# Patient Record
Sex: FEMALE
[De-identification: names, ages, dates, MRNs, and addresses within clinical notes are randomized per-mention and may not be internally consistent; named-entity substitution may affect disease eponyms.]

---

## 2017-01-07 ENCOUNTER — HOSPITAL ENCOUNTER (INPATIENT)
Dept: HOSPITAL 5 - ED | Age: 22
LOS: 12 days | Discharge: HOME | DRG: 885 | End: 2017-01-19
Attending: INTERNAL MEDICINE | Admitting: INTERNAL MEDICINE
Payer: MEDICAID
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
## 2017-01-07 DIAGNOSIS — T43.595A: ICD-10-CM

## 2017-01-07 DIAGNOSIS — F20.9: ICD-10-CM

## 2017-01-07 DIAGNOSIS — N39.0: ICD-10-CM

## 2017-01-07 DIAGNOSIS — B96.20: ICD-10-CM

## 2017-01-07 DIAGNOSIS — F17.200: ICD-10-CM

## 2017-01-07 DIAGNOSIS — G43.909: ICD-10-CM

## 2017-01-07 DIAGNOSIS — Z79.899: ICD-10-CM

## 2017-01-07 DIAGNOSIS — K59.00: ICD-10-CM

## 2017-01-07 DIAGNOSIS — F31.9: Primary | ICD-10-CM

## 2017-01-07 LAB
ALBUMIN SERPL-MCNC: 3.5 G/DL (ref 3.9–5)
ALBUMIN/GLOB SERPL: 1 %
ALP SERPL-CCNC: 72 UNITS/L (ref 35–129)
ALT SERPL-CCNC: 12 UNITS/L (ref 7–56)
ANION GAP SERPL CALC-SCNC: 19 MMOL/L
ANISOCYTOSIS BLD QL SMEAR: (no result)
BACTERIA #/AREA URNS HPF: (no result) /HPF
BILIRUB DIRECT SERPL-MCNC: < 0.2 MG/DL (ref 0–0.2)
BILIRUB INDIRECT SERPL-MCNC: 0 MG/DL
BILIRUB SERPL-MCNC: < 0.2 MG/DL (ref 0.1–1.2)
BILIRUB UR QL STRIP: (no result)
BLASTOCYTES % (MANUAL): 0 %
BLOOD UR QL VISUAL: (no result)
BUN SERPL-MCNC: 9 MG/DL (ref 7–17)
BUN/CREAT SERPL: 15 %
CALCIUM SERPL-MCNC: 9 MG/DL (ref 8.4–10.2)
CHLORIDE SERPL-SCNC: 104.7 MMOL/L (ref 98–107)
CO2 SERPL-SCNC: 22 MMOL/L (ref 22–30)
GLUCOSE SERPL-MCNC: 96 MG/DL (ref 65–100)
HCT VFR BLD CALC: 39.5 % (ref 30.3–42.9)
HGB BLD-MCNC: 12.9 GM/DL (ref 10.1–14.3)
KETONES UR STRIP-MCNC: (no result) MG/DL
LEUKOCYTE ESTERASE UR QL STRIP: (no result)
MCH RBC QN AUTO: 28 PG (ref 28–32)
MCHC RBC AUTO-ENTMCNC: 33 % (ref 30–34)
MCV RBC AUTO: 87 FL (ref 79–97)
MUCOUS THREADS #/AREA URNS HPF: (no result) /HPF
NITRITE UR QL STRIP: (no result)
PH UR STRIP: 5 [PH] (ref 5–7)
PLATELET # BLD: 407 K/MM3 (ref 140–440)
POTASSIUM SERPL-SCNC: 4.9 MMOL/L (ref 3.6–5)
PROT SERPL-MCNC: 6.9 G/DL (ref 6.3–8.2)
RBC # BLD AUTO: 4.56 M/MM3 (ref 3.65–5.03)
RBC #/AREA URNS HPF: 3 /HPF (ref 0–6)
SODIUM SERPL-SCNC: 141 MMOL/L (ref 137–145)
TOTAL CELLS COUNTED PERCENT: 10
URINE DRUGS OF ABUSE NOTE: (no result)
UROBILINOGEN UR-MCNC: 2 MG/DL (ref ?–2)
WBC # BLD AUTO: 12.8 K/MM3 (ref 4.5–11)
WBC #/AREA URNS HPF: 22 /HPF (ref 0–6)

## 2017-01-07 PROCEDURE — 81025 URINE PREGNANCY TEST: CPT

## 2017-01-07 PROCEDURE — 81001 URINALYSIS AUTO W/SCOPE: CPT

## 2017-01-07 PROCEDURE — 99406 BEHAV CHNG SMOKING 3-10 MIN: CPT

## 2017-01-07 PROCEDURE — 87040 BLOOD CULTURE FOR BACTERIA: CPT

## 2017-01-07 PROCEDURE — 82550 ASSAY OF CK (CPK): CPT

## 2017-01-07 PROCEDURE — 87186 SC STD MICRODIL/AGAR DIL: CPT

## 2017-01-07 PROCEDURE — 87076 CULTURE ANAEROBE IDENT EACH: CPT

## 2017-01-07 PROCEDURE — 85007 BL SMEAR W/DIFF WBC COUNT: CPT

## 2017-01-07 PROCEDURE — 85025 COMPLETE CBC W/AUTO DIFF WBC: CPT

## 2017-01-07 PROCEDURE — 99285 EMERGENCY DEPT VISIT HI MDM: CPT

## 2017-01-07 PROCEDURE — 80053 COMPREHEN METABOLIC PANEL: CPT

## 2017-01-07 PROCEDURE — 80320 DRUG SCREEN QUANTALCOHOLS: CPT

## 2017-01-07 PROCEDURE — 80048 BASIC METABOLIC PNL TOTAL CA: CPT

## 2017-01-07 PROCEDURE — 80307 DRUG TEST PRSMV CHEM ANLYZR: CPT

## 2017-01-07 PROCEDURE — G0480 DRUG TEST DEF 1-7 CLASSES: HCPCS

## 2017-01-07 PROCEDURE — 80074 ACUTE HEPATITIS PANEL: CPT

## 2017-01-07 PROCEDURE — 80178 ASSAY OF LITHIUM: CPT

## 2017-01-07 PROCEDURE — 36415 COLL VENOUS BLD VENIPUNCTURE: CPT

## 2017-01-07 PROCEDURE — 87086 URINE CULTURE/COLONY COUNT: CPT

## 2017-01-07 NOTE — EMERGENCY DEPARTMENT REPORT
HPI





- General


Chief Complaint: Psych


Time Seen by Provider: 01/07/17 17:37





- HPI


HPI: 


Room 15





The patient is a 21-year-old female presenting with a chief complaint of 

aggressive behavior.  The patient states she was at home and began "acting out"

, being disrespectful family.  Subsequently police were called and he fell the 

patient crying irate and upset.  Police report the caretaker advised that the 

patient was offered medications, holes in walls of the residents has a history 

of schizophrenia and is psychotic.  The patient was subsequently brought to the 

emergency department in handcuffs by police.  Patient denies suicidal or 

homicidal ideation.  Patient denies auditory or visual hallucinations.  The 

patient states she feels upset.  The patient states she does not have access to 

her medications.  The patient states she's been on lithium for 3 years and her 

arm administers them to her daily.  The patient states last time she took 

lithium was approximately 2 days ago





Location: Mental state


Duration: [see above]


Quality: Aggressive


Severity: Moderate


Modifying factors: [see above]


Context: [see above]


Mode of transportation: [not driving]














ED Past Medical Hx





- Past Medical History


Previous Medical History?: Yes


Hx Psychiatric Treatment: Yes (bipolar)


Additional medical history: Pt stated that she suffered from migraine, 

depression





- Surgical History


Past Surgical History?: No





- Family History


Family history: no significant





- Social History


Smoking Status: Current Every Day Smoker (one pack per day)


Substance Use Type: None (denies illicit drug use)





- Medications


Home Medications: 


 Home Medications











 Medication  Instructions  Recorded  Confirmed  Last Taken  Type


 


Lithium  01/07/17  Unknown History


 


Wellbutrin  01/07/17  Unknown History














ED Review of Systems


ROS: 


Stated complaint: 1013


Other details as noted in HPI





Comment: All other systems reviewed and negative


Constitutional: denies: chills, fever


Eyes: denies: eye pain, eye discharge, vision change


ENT: denies: ear pain, throat pain


Respiratory: denies: cough, shortness of breath, wheezing


Cardiovascular: denies: chest pain, palpitations


Endocrine: no symptoms reported


Gastrointestinal: denies: abdominal pain, nausea, diarrhea


Genitourinary: denies: urgency, dysuria, discharge


Musculoskeletal: denies: back pain, joint swelling, arthralgia


Skin: denies: rash, lesions


Neurological: denies: headache, weakness, paresthesias


Psychiatric: other ("upset").  denies: auditory hallucinations, visual 

hallucinations, homicidal thoughts, suicidal thoughts


Hematological/Lymphatic: denies: easy bleeding, easy bruising





Physical Exam





- Physical Exam


Vital Signs: 


 Vital Signs











  01/07/17 01/07/17





  15:39 18:11


 


Temperature 97.7 F 


 


Pulse Rate 120 H 


 


Respiratory 24 24





Rate  


 


Blood Pressure 111/63 


 


O2 Sat by Pulse 95 95





Oximetry  











Physical Exam: 


GENERAL: The patient is well-developed well-nourished female lying on stretcher 

appearing emotionally distraught. []


HEENT: Normocephalic.  Extraocular motions are intact.  Patient has moist 

mucous membranes.


NECK: Supple.  Trachea Midline


CHEST/LUNGS: Clear to auscultation.  There is no respiratory distress noted.


HEART/CARDIOVASCULAR: Regular.  There is no tachycardia.  There is no gallop 

rub or murmur.


ABDOMEN: Abdomen is soft, nontender.  Patient has normal bowel sounds.  There 

is no abdominal distention.


SKIN: There is no rash.  There is no edema.  There is no diaphoresis.


NEURO: The patient is awake, alert, and oriented.  The patient is cooperative. 

  The patient has normal speech 


MUSCULOSKELETAL: There is no evidence of acute injury.








ED Course


 Vital Signs











  01/07/17 01/07/17





  15:39 18:11


 


Temperature 97.7 F 


 


Pulse Rate 120 H 


 


Respiratory 24 24





Rate  


 


Blood Pressure 111/63 


 


O2 Sat by Pulse 95 95





Oximetry  














- Consultations


Consultation #1: 





01/07/17 18:29


Poison control called- case discussed with poison control.  Recommend 

symptomatic and supportive care at this time.  Recommends following lithium 

levels every 2-4 hours until downtrending 2 in the therapeutic range





01/07/17 18:37








ED Medical Decision Making





- Lab Data


Result diagrams: 


 01/07/17 16:25





 01/07/17 16:25





 Laboratory Tests











  01/07/17 01/07/17 01/07/17





  15:52 15:52 16:25


 


WBC   


 


RBC   


 


Hgb   


 


Hct   


 


MCV   


 


MCH   


 


MCHC   


 


RDW   


 


Plt Count   


 


Lymph #   


 


Sodium    141


 


Potassium    4.9


 


Chloride    104.7


 


Carbon Dioxide    22


 


Anion Gap    19


 


BUN    9


 


Creatinine    0.6 L


 


Estimated GFR    > 60


 


BUN/Creatinine Ratio    15.00


 


Glucose    96


 


Calcium    9.0


 


Urine Color  Rabia  


 


Urine Turbidity  Slightly-cloudy  


 


Urine pH  5.0  


 


Ur Specific Gravity  1.024  


 


Urine Protein  30 mg/dl  


 


Urine Glucose (UA)  Neg  


 


Urine Ketones  Tr  


 


Urine Blood  Neg  


 


Urine Nitrite  Pos  


 


Urine Bilirubin  Neg  


 


Urine Urobilinogen  2.0  


 


Ur Leukocyte Esterase  Tr  


 


Urine WBC (Auto)  22.0 H  


 


Urine RBC (Auto)  3.0  


 


U Epithel Cells (Auto)  13.0  


 


Urine Bacteria (Auto)  1+  


 


Urine Mucus  3+  


 


Urine HCG, Qual  Negative  


 


Urine Opiates Screen   Presumptive negative 


 


Urine Methadone Screen   Presumptive negative 


 


Ur Barbiturates Screen   Presumptive negative 


 


Ur Phencyclidine Scrn   Presumptive negative 


 


Ur Amphetamines Screen   Presumptive positive 


 


U Benzodiazepines Scrn   Presumptive negative 


 


Lithium   


 


Urine Cocaine Screen   Presumptive negative 


 


U Marijuana (THC) Screen   Presumptive negative 


 


Drugs of Abuse Note   Disclamer 


 


Plasma/Serum Alcohol   














  01/07/17 01/07/17 01/07/17





  16:25 16:25 16:25


 


WBC   12.8 H 


 


RBC   4.56 


 


Hgb   12.9 


 


Hct   39.5 


 


MCV   87 


 


MCH   28 


 


MCHC   33 


 


RDW   14.3 


 


Plt Count   407 


 


Lymph #   Np 


 


Sodium   


 


Potassium   


 


Chloride   


 


Carbon Dioxide   


 


Anion Gap   


 


BUN   


 


Creatinine   


 


Estimated GFR   


 


BUN/Creatinine Ratio   


 


Glucose   


 


Calcium   


 


Urine Color   


 


Urine Turbidity   


 


Urine pH   


 


Ur Specific Gravity   


 


Urine Protein   


 


Urine Glucose (UA)   


 


Urine Ketones   


 


Urine Blood   


 


Urine Nitrite   


 


Urine Bilirubin   


 


Urine Urobilinogen   


 


Ur Leukocyte Esterase   


 


Urine WBC (Auto)   


 


Urine RBC (Auto)   


 


U Epithel Cells (Auto)   


 


Urine Bacteria (Auto)   


 


Urine Mucus   


 


Urine HCG, Qual   


 


Urine Opiates Screen   


 


Urine Methadone Screen   


 


Ur Barbiturates Screen   


 


Ur Phencyclidine Scrn   


 


Ur Amphetamines Screen   


 


U Benzodiazepines Scrn   


 


Lithium    2.5 H*


 


Urine Cocaine Screen   


 


U Marijuana (THC) Screen   


 


Drugs of Abuse Note   


 


Plasma/Serum Alcohol  < 0.01  

















- Differential Diagnosis


lithium toxicity, bipolar disorder


Critical care attestation.: 


If time is entered above; I have spent that time in minutes in the direct care 

of this critically ill patient, excluding procedure time.








ED Disposition


Clinical Impression: 


 Lithium toxicity, Bipolar disorder


Disposition: OP ADMITTED AS IP TO THIS HOSP


Is pt being admited?: Yes


Does the pt Need Aspirin: No


Condition: Fair


Time of Disposition: 18:46 (hospitalist paged)

## 2017-01-08 LAB
ALBUMIN SERPL-MCNC: 3.4 G/DL (ref 3.9–5)
ALBUMIN/GLOB SERPL: 1 %
ALP SERPL-CCNC: 70 UNITS/L (ref 35–129)
ALT SERPL-CCNC: 12 UNITS/L (ref 7–56)
ANION GAP SERPL CALC-SCNC: 18 MMOL/L
ANISOCYTOSIS BLD QL SMEAR: (no result)
BILIRUB SERPL-MCNC: 0.3 MG/DL (ref 0.1–1.2)
BLASTOCYTES % (MANUAL): 0 %
BUN SERPL-MCNC: 7 MG/DL (ref 7–17)
BUN/CREAT SERPL: 14 %
CALCIUM SERPL-MCNC: 9 MG/DL (ref 8.4–10.2)
CHLORIDE SERPL-SCNC: 103.3 MMOL/L (ref 98–107)
CO2 SERPL-SCNC: 21 MMOL/L (ref 22–30)
GLUCOSE SERPL-MCNC: 82 MG/DL (ref 65–100)
HCT VFR BLD CALC: 39.3 % (ref 30.3–42.9)
HGB BLD-MCNC: 12.9 GM/DL (ref 10.1–14.3)
MCH RBC QN AUTO: 29 PG (ref 28–32)
MCHC RBC AUTO-ENTMCNC: 33 % (ref 30–34)
MCV RBC AUTO: 88 FL (ref 79–97)
PLATELET # BLD: 394 K/MM3 (ref 140–440)
POTASSIUM SERPL-SCNC: 4.3 MMOL/L (ref 3.6–5)
PROT SERPL-MCNC: 6.8 G/DL (ref 6.3–8.2)
RBC # BLD AUTO: 4.48 M/MM3 (ref 3.65–5.03)
SODIUM SERPL-SCNC: 138 MMOL/L (ref 137–145)
TOTAL CELLS COUNTED PERCENT: 5
WBC # BLD AUTO: 12.6 K/MM3 (ref 4.5–11)

## 2017-01-08 RX ADMIN — ENOXAPARIN SODIUM SCH: 100 INJECTION SUBCUTANEOUS at 10:35

## 2017-01-08 RX ADMIN — CEFTRIAXONE SODIUM SCH: 1 INJECTION, POWDER, FOR SOLUTION INTRAMUSCULAR; INTRAVENOUS at 10:35

## 2017-01-08 NOTE — PROGRESS NOTE
Assessment and Plan


Assessment and plan: 


1. Acute psychosis with h/o schizophrenia with - lithium toxicity; cotn to hold 

lithium; she refused lab work; 1013 for safety and consult psyche


2. UTI - cotn ceftriaxone; urine c/s 


3. DVT prophylaxis- lovenox








History


Interval history: 


f/u acute psychosis; lithium toxicity


Patient agitated and aggresive; refused lab work for lithium level  








Hospitalist Physical





- Constitutional


Vitals: 


 











Temp Pulse Resp BP Pulse Ox


 


 97.8 F   86   16   97/59   96 


 


 01/08/17 08:15  01/08/17 08:15  01/08/17 08:15  01/08/17 08:15  01/08/17 08:15











General appearance: Present: no acute distress, other (irriated)





- EENT


Eyes: Present: PERRL, EOM intact.  Absent: scleral icterus, conjunctival 

injection


ENT: hearing intact





- Neck


Neck: Present: supple, normal ROM.  Absent: enlarged thyroid, masses or JVD





- Respiratory


Respiratory effort: normal


Respiratory: negative: diminished, rales, rhonchi, wheezing





- Cardiovascular


Rhythm: regular


Heart Sounds: Present: S1 & S2.  Absent: gallop





- Extremities


Extremities: no ischemia, pulses intact, pulses symmetrical, No edema


Peripheral Pulses: within normal limits





- Abdominal


General gastrointestinal: soft, non-tender, non-distended, normal bowel sounds





- Integumentary


Integumentary: Present: clear





- Psychiatric


Psychiatric: no intact judgment & insight (aggressive tone and abusive language 

with expletives), agitated, other





- Neurologic


Neurologic: CNII-XII intact, moves all extremities





Results





- Labs


CBC & Chem 7: 


 01/07/17 16:25





 01/07/17 16:25


Labs: 


 Laboratory Last Values











WBC  12.8 K/mm3 (4.5-11.0)  H  01/07/17  16:25    


 


RBC  4.56 M/mm3 (3.65-5.03)   01/07/17  16:25    


 


Hgb  12.9 gm/dl (10.1-14.3)   01/07/17  16:25    


 


Hct  39.5 % (30.3-42.9)   01/07/17  16:25    


 


MCV  87 fl (79-97)   01/07/17  16:25    


 


MCH  28 pg (28-32)   01/07/17  16:25    


 


MCHC  33 % (30-34)   01/07/17  16:25    


 


RDW  14.3 % (13.2-15.2)   01/07/17  16:25    


 


Plt Count  407 K/mm3 (140-440)   01/07/17  16:25    


 


Lymph #  Np   01/07/17  16:25    


 


Add Manual Diff  Complete   01/07/17  16:25    


 


Total Counted  100   01/07/17  16:25    


 


Seg Neuts % (Manual)  53.0 % (40.0-70.0)   01/07/17  16:25    


 


Band Neutrophils %  0 %  01/07/17  16:25    


 


Lymphocytes % (Manual)  37.0 % (13.4-35.0)  H  01/07/17  16:25    


 


Reactive Lymphs % (Man)  0 %  01/07/17  16:25    


 


Monocytes % (Manual)  6.0 % (0.0-7.3)   01/07/17  16:25    


 


Eosinophils % (Manual)  4.0 % (0.0-4.3)   01/07/17  16:25    


 


Basophils % (Manual)  0 % (0.0-1.8)   01/07/17  16:25    


 


Metamyelocytes %  0 %  01/07/17  16:25    


 


Myelocytes %  0 %  01/07/17  16:25    


 


Promyelocytes %  0 %  01/07/17  16:25    


 


Blast Cells %  0 %  01/07/17  16:25    


 


Nucleated RBC %  Not Reportable   01/07/17  16:25    


 


Seg Neutrophils # Man  6.8 K/mm3 (1.8-7.7)   01/07/17  16:25    


 


Band Neutrophils #  0.0 K/mm3  01/07/17  16:25    


 


Lymphocytes # (Manual)  4.7 K/mm3 (1.2-5.4)   01/07/17  16:25    


 


Abs React Lymphs (Man)  0.0 K/mm3  01/07/17  16:25    


 


Monocytes # (Manual)  0.8 K/mm3 (0.0-0.8)   01/07/17  16:25    


 


Eosinophils # (Manual)  0.5 K/mm3 (0.0-0.4)  H  01/07/17  16:25    


 


Basophils # (Manual)  0.0 K/mm3 (0.0-0.1)   01/07/17  16:25    


 


Metamyelocytes #  0.0 K/mm3  01/07/17  16:25    


 


Myelocytes #  0.0 K/mm3  01/07/17  16:25    


 


Promyelocytes #  0.0 K/mm3  01/07/17  16:25    


 


Blast Cells #  0.0 K/mm3  01/07/17  16:25    


 


WBC Morphology  Not Reportable   01/07/17  16:25    


 


Hypersegmented Neuts  Not Reportable   01/07/17  16:25    


 


Hyposegmented Neuts  Not Reportable   01/07/17  16:25    


 


Hypogranular Neuts  Not Reportable   01/07/17  16:25    


 


Smudge Cells  Not Reportable   01/07/17  16:25    


 


Toxic Granulation  Not Reportable   01/07/17  16:25    


 


Toxic Vacuolation  Not Reportable   01/07/17  16:25    


 


Dohle Bodies  Not Reportable   01/07/17  16:25    


 


Pelger-Huet Anomaly  Not Reportable   01/07/17  16:25    


 


Imer Rods  Not Reportable   01/07/17  16:25    


 


Platelet Estimate  Cons   01/07/17  16:25    


 


Clumped Platelets  Not Reportable   01/07/17  16:25    


 


Plt Clumps, EDTA  Not Reportable   01/07/17  16:25    


 


Large Platelets  Few   01/07/17  16:25    


 


Giant Platelets  Not Reportable   01/07/17  16:25    


 


Platelet Satelliting  Not Reportable   01/07/17  16:25    


 


Plt Morphology Comment  Not Reportable   01/07/17  16:25    


 


RBC Morphology  Not Reportable   01/07/17  16:25    


 


Dimorphic RBCs  Not Reportable   01/07/17  16:25    


 


Polychromasia  Not Reportable   01/07/17  16:25    


 


Hypochromasia  Not Reportable   01/07/17  16:25    


 


Poikilocytosis  Not Reportable   01/07/17  16:25    


 


Anisocytosis  1+   01/07/17  16:25    


 


Microcytosis  Not Reportable   01/07/17  16:25    


 


Macrocytosis  Not Reportable   01/07/17  16:25    


 


Spherocytes  Not Reportable   01/07/17  16:25    


 


Pappenheimer Bodies  Not Reportable   01/07/17  16:25    


 


Sickle Cells  Not Reportable   01/07/17  16:25    


 


Target Cells  Not Reportable   01/07/17  16:25    


 


Tear Drop Cells  Not Reportable   01/07/17  16:25    


 


Ovalocytes  Not Reportable   01/07/17  16:25    


 


Helmet Cells  Not Reportable   01/07/17  16:25    


 


[place] Bodies  Not Reportable   01/07/17  16:25    


 


Cabot Rings  Not Reportable   01/07/17  16:25    


 


Ashok Cells  Not Reportable   01/07/17  16:25    


 


Bite Cells  Not Reportable   01/07/17  16:25    


 


Crenated Cell  Not Reportable   01/07/17  16:25    


 


Elliptocytes  Not Reportable   01/07/17  16:25    


 


Acanthocytes (Spur)  Not Reportable   01/07/17  16:25    


 


Rouleaux  Not Reportable   01/07/17  16:25    


 


Hemoglobin C Crystals  Not Reportable   01/07/17  16:25    


 


Schistocytes  Not Reportable   01/07/17  16:25    


 


Malaria parasites  Not Reportable   01/07/17  16:25    


 


Tutu Bodies  Not Reportable   01/07/17  16:25    


 


Hem Pathologist Commnt  No   01/07/17  16:25    


 


Sodium  141 mmol/L (137-145)   01/07/17  16:25    


 


Potassium  4.9 mmol/L (3.6-5.0)   01/07/17  16:25    


 


Chloride  104.7 mmol/L ()   01/07/17  16:25    


 


Carbon Dioxide  22 mmol/L (22-30)   01/07/17  16:25    


 


Anion Gap  19 mmol/L  01/07/17  16:25    


 


BUN  9 mg/dL (7-17)   01/07/17  16:25    


 


Creatinine  0.6 mg/dL (0.7-1.2)  L  01/07/17  16:25    


 


Estimated GFR  > 60 ml/min  01/07/17  16:25    


 


BUN/Creatinine Ratio  15.00 %  01/07/17  16:25    


 


Glucose  96 mg/dL ()   01/07/17  16:25    


 


Calcium  9.0 mg/dL (8.4-10.2)   01/07/17  16:25    


 


Total Bilirubin  < 0.2 mg/dL (0.1-1.2)   01/07/17  16:25    


 


Direct Bilirubin  < 0.2 mg/dL (0-0.2)   01/07/17  16:25    


 


Indirect Bilirubin  0.0 mg/dL  01/07/17  16:25    


 


AST  15 units/L (5-40)   01/07/17  16:25    


 


ALT  12 units/L (7-56)   01/07/17  16:25    


 


Alkaline Phosphatase  72 units/L ()   01/07/17  16:25    


 


Total Protein  6.9 g/dL (6.3-8.2)   01/07/17  16:25    


 


Albumin  3.5 g/dL (3.9-5)  L  01/07/17  16:25    


 


Albumin/Globulin Ratio  1.0 %  01/07/17  16:25    


 


Urine Color  Rabia  (Yellow)   01/07/17  15:52    


 


Urine Turbidity  Slightly-cloudy  (Clear)   01/07/17  15:52    


 


Urine pH  5.0  (5.0-7.0)   01/07/17  15:52    


 


Ur Specific Gravity  1.024  (1.003-1.030)   01/07/17  15:52    


 


Urine Protein  30 mg/dl mg/dL (Negative)   01/07/17  15:52    


 


Urine Glucose (UA)  Neg mg/dL (Negative)   01/07/17  15:52    


 


Urine Ketones  Tr mg/dL (Negative)   01/07/17  15:52    


 


Urine Blood  Neg  (Negative)   01/07/17  15:52    


 


Urine Nitrite  Pos  (Negative)   01/07/17  15:52    


 


Urine Bilirubin  Neg  (Negative)   01/07/17  15:52    


 


Urine Urobilinogen  2.0 mg/dL (<2.0)   01/07/17  15:52    


 


Ur Leukocyte Esterase  Tr  (Negative)   01/07/17  15:52    


 


Urine WBC (Auto)  22.0 /HPF (0.0-6.0)  H  01/07/17  15:52    


 


Urine RBC (Auto)  3.0 /HPF (0.0-6.0)   01/07/17  15:52    


 


U Epithel Cells (Auto)  13.0 /HPF (0-13.0)   01/07/17  15:52    


 


Urine Bacteria (Auto)  1+ /HPF (Negative)   01/07/17  15:52    


 


Urine Mucus  3+ /HPF  01/07/17  15:52    


 


Urine HCG, Qual  Negative  (Negative)   01/07/17  15:52    


 


Salicylates  < 0.3 mg/dL (2.8-20.0)  L  01/07/17  16:25    


 


Urine Opiates Screen  Presumptive negative   01/07/17  15:52    


 


Urine Methadone Screen  Presumptive negative   01/07/17  15:52    


 


Acetaminophen  < 15.0 ug/mL (10.0-30.0)   01/07/17  16:25    


 


Ur Barbiturates Screen  Presumptive negative   01/07/17  15:52    


 


Ur Phencyclidine Scrn  Presumptive negative   01/07/17  15:52    


 


Ur Amphetamines Screen  Presumptive positive   01/07/17  15:52    


 


U Benzodiazepines Scrn  Presumptive negative   01/07/17  15:52    


 


Lithium  2.5 mmol/L (0.0-1.2)  H*  01/07/17  16:25    


 


Urine Cocaine Screen  Presumptive negative   01/07/17  15:52    


 


U Marijuana (THC) Screen  Presumptive negative   01/07/17  15:52    


 


Drugs of Abuse Note  Disclamer   01/07/17  15:52    


 


Plasma/Serum Alcohol  < 0.01 gm% (0-0.07)   01/07/17  16:25

## 2017-01-08 NOTE — HISTORY AND PHYSICAL REPORT
CHIEF COMPLAINT:  Aggressive behavior.



HISTORY OF PRESENT ILLNESS:  A 21-year-old female with a chief complaint of

aggressive behavior, brought in for acting out and being disrespect with the

family.  Subsequently, police were called and the patient started crying and

became agitated.  The patient was off her medication, but has been refusing to 
take

the medication.  She has history of schizophrenia and psychosis.  Was brought to

the Emergency Department in handcuffs.  The patient denies suicidal or homicidal

ideation.  The patient denies auditory or visual hallucination.  The patient

states she feels upset.  The patient states she does not have access to Medicare

to have medications.  The patient has been having lithium for 3 hours and

however, her aunt says she takes lithium everyday.  The patient states the last 
time she took

lithium was approximately 2 days ago.



PAST MEDICAL HISTORY:  Significant for bipolar disorder, migraine, and

depression.



PAST SURGICAL HISTORY:  None.



FAMILY HISTORY:  No significant family history like hypertension, schizophrenia,

etc.



SOCIAL HISTORY:  She smokes a pack a day.  Denies illicit drug use.



CURRENT MEDICATIONS:  Lithium and Wellbutrin.



REVIEW OF SYSTEMS:  Significant for aggressive behavior.  Otherwise, review of

systems is essentially negative.  No shortness of breath, no chest pain, no

palpitations, no nausea, no vomiting.  All 14 systems were reviewed.



PHYSICAL EXAMINATION:

GENERAL:  Young female lying in bed, agitated.

VITAL SIGNS:  Blood pressure is 111/63, temperature is 97.7, pulse is 120,

respiratory rate is 24.

HEENT:  Unremarkable.  Pupils equal and reactive.

NECK:  Supple, no lymphadenopathy, no thyromegaly.

LUNGS:  Clear to auscultation and percussion.  Good air entry.

CARDIOVASCULAR:  S1, S2 heard.  No gallop, no murmur, no rub.  Apical impulse in

the left fifth intercostal space and midclavicular line.

ABDOMEN:  Soft and benign.  No hepatosplenomegaly.  No guarding, no rigidity. 

Hernial orifices are normal.

EXTREMITIES:  Good pedal pulses.  No pedal edema.

CENTRAL NERVOUS SYSTEM:  Alert and oriented x4, nonfocal exam.



LABORATORY DATA:  White count is 12,800.  Electrolytes are normal.  Lithium

level is high at 2.5.  Albumin is 3.5.  Urine, wbc is 22.  Drug screen is

positive for amphetamines.



ASSESSMENT AND PLAN:

1.  Lithium toxicity.  The patient's lithium to be kept on hold and IV fluids,

and check the lithium level tomorrow and day after tomorrow.

2.  Urinary tract infection, treated with Rocephin 1 g IV piggyback q.24.

3.  Bipolar disorder, lithium and Wellbutrin kept on hold, mental health consult

requested.

4.  Deep venous thrombosis prophylaxis, Lovenox 40 mg subcutaneous daily.





DD: 01/08/2017 01:13

DT: 01/08/2017 07:07

JOB# [id number]  [id number]

SCOTTIE/CRYSTAL DAVILA

## 2017-01-09 RX ADMIN — ENOXAPARIN SODIUM SCH MG: 100 INJECTION SUBCUTANEOUS at 11:28

## 2017-01-09 RX ADMIN — CEFTRIAXONE SODIUM SCH MLS/HR: 1 INJECTION, POWDER, FOR SOLUTION INTRAMUSCULAR; INTRAVENOUS at 11:29

## 2017-01-09 NOTE — PROGRESS NOTE
Assessment and Plan


Assessment and plan: 


1. Acute psychosis with h/o schizophrenia with - lithium toxicity; cotn to hold 

lithium; she refused lab work today; 1013 for safety and await consult psyche


2. UTI - cotn ceftriaxone- today is day 2 /3; urine c/s ordered but done


3. DVT prophylaxis- lovenox








History


Interval history: 


f/u acute psychosis; lithium toxicity


Patient seen at the bedside; refused blood work for lithium level   








Hospitalist Physical





- Constitutional


Vitals: 


 











Temp Pulse Resp BP Pulse Ox


 


 97.5 F L  104 H  16   109/66   94 


 


 01/09/17 09:32  01/09/17 09:32  01/09/17 09:32  01/09/17 09:32  01/09/17 09:32











General appearance: Present: no acute distress, other (irriated)





- EENT


Eyes: Present: PERRL, EOM intact.  Absent: scleral icterus, conjunctival 

injection


ENT: hearing intact, clear oral mucosa, no oropharyngeal erythema, no poor 

dentition





- Neck


Neck: Present: supple, normal ROM.  Absent: enlarged thyroid, masses or JVD





- Respiratory


Respiratory effort: normal


Respiratory: negative: CTA, diminished, rales, rhonchi, wheezing





- Cardiovascular


Rhythm: regular


Heart Sounds: Present: S1 & S2.  Absent: gallop





- Extremities


Extremities: no ischemia, pulses intact, pulses symmetrical, No edema


Peripheral Pulses: within normal limits





- Abdominal


General gastrointestinal: soft, non-tender, non-distended





- Integumentary


Integumentary: Present: clear





- Psychiatric


Psychiatric: appropriate mood/affect, intact judgment & insight, cooperative





- Neurologic


Neurologic: CNII-XII intact, moves all extremities





Results





- Labs


CBC & Chem 7: 


 01/08/17 16:21





 01/08/17 16:21


Labs: 


 Laboratory Last Values











WBC  12.6 K/mm3 (4.5-11.0)  H  01/08/17  16:21    


 


RBC  4.48 M/mm3 (3.65-5.03)   01/08/17  16:21    


 


Hgb  12.9 gm/dl (10.1-14.3)   01/08/17  16:21    


 


Hct  39.3 % (30.3-42.9)   01/08/17  16:21    


 


MCV  88 fl (79-97)   01/08/17  16:21    


 


MCH  29 pg (28-32)   01/08/17  16:21    


 


MCHC  33 % (30-34)   01/08/17  16:21    


 


RDW  14.6 % (13.2-15.2)   01/08/17  16:21    


 


Plt Count  394 K/mm3 (140-440)   01/08/17  16:21    


 


Lymph #  Np   01/08/17  16:21    


 


Add Manual Diff  Complete   01/08/17  16:21    


 


Total Counted  100   01/08/17  16:21    


 


Seg Neuts % (Manual)  57.0 % (40.0-70.0)   01/08/17  16:21    


 


Band Neutrophils %  0 %  01/08/17  16:21    


 


Lymphocytes % (Manual)  38.0 % (13.4-35.0)  H  01/08/17  16:21    


 


Reactive Lymphs % (Man)  0 %  01/08/17  16:21    


 


Monocytes % (Manual)  4.0 % (0.0-7.3)   01/08/17  16:21    


 


Eosinophils % (Manual)  1.0 % (0.0-4.3)   01/08/17  16:21    


 


Basophils % (Manual)  0 % (0.0-1.8)   01/08/17  16:21    


 


Metamyelocytes %  0 %  01/08/17  16:21    


 


Myelocytes %  0 %  01/08/17  16:21    


 


Promyelocytes %  0 %  01/08/17  16:21    


 


Blast Cells %  0 %  01/08/17  16:21    


 


Nucleated RBC %  Not Reportable   01/08/17  16:21    


 


Seg Neutrophils # Man  7.2 K/mm3 (1.8-7.7)   01/08/17  16:21    


 


Band Neutrophils #  0.0 K/mm3  01/08/17  16:21    


 


Lymphocytes # (Manual)  4.8 K/mm3 (1.2-5.4)   01/08/17  16:21    


 


Abs React Lymphs (Man)  0.0 K/mm3  01/08/17  16:21    


 


Monocytes # (Manual)  0.5 K/mm3 (0.0-0.8)   01/08/17  16:21    


 


Eosinophils # (Manual)  0.1 K/mm3 (0.0-0.4)   01/08/17  16:21    


 


Basophils # (Manual)  0.0 K/mm3 (0.0-0.1)   01/08/17  16:21    


 


Metamyelocytes #  0.0 K/mm3  01/08/17  16:21    


 


Myelocytes #  0.0 K/mm3  01/08/17  16:21    


 


Promyelocytes #  0.0 K/mm3  01/08/17  16:21    


 


Blast Cells #  0.0 K/mm3  01/08/17  16:21    


 


WBC Morphology  Not Reportable   01/08/17  16:21    


 


Hypersegmented Neuts  Not Reportable   01/08/17  16:21    


 


Hyposegmented Neuts  Not Reportable   01/08/17  16:21    


 


Hypogranular Neuts  Not Reportable   01/08/17  16:21    


 


Smudge Cells  Not Reportable   01/08/17  16:21    


 


Toxic Granulation  Not Reportable   01/08/17  16:21    


 


Toxic Vacuolation  Not Reportable   01/08/17  16:21    


 


Dohle Bodies  Not Reportable   01/08/17  16:21    


 


Pelger-Huet Anomaly  Not Reportable   01/08/17  16:21    


 


Imer Rods  Not Reportable   01/08/17  16:21    


 


Platelet Estimate  Consistent w auto   01/08/17  16:21    


 


Clumped Platelets  Not Reportable   01/08/17  16:21    


 


Plt Clumps, EDTA  Not Reportable   01/08/17  16:21    


 


Large Platelets  Not Reportable   01/08/17  16:21    


 


Giant Platelets  Not Reportable   01/08/17  16:21    


 


Platelet Satelliting  Not Reportable   01/08/17  16:21    


 


Plt Morphology Comment  Not Reportable   01/08/17  16:21    


 


RBC Morphology  Not Reportable   01/08/17  16:21    


 


Dimorphic RBCs  Not Reportable   01/08/17  16:21    


 


Polychromasia  Not Reportable   01/08/17  16:21    


 


Hypochromasia  Not Reportable   01/08/17  16:21    


 


Poikilocytosis  Not Reportable   01/08/17  16:21    


 


Anisocytosis  1+   01/08/17  16:21    


 


Microcytosis  Not Reportable   01/08/17  16:21    


 


Macrocytosis  Not Reportable   01/08/17  16:21    


 


Spherocytes  Not Reportable   01/08/17  16:21    


 


Pappenheimer Bodies  Not Reportable   01/08/17  16:21    


 


Sickle Cells  Not Reportable   01/08/17  16:21    


 


Target Cells  Not Reportable   01/08/17  16:21    


 


Tear Drop Cells  Not Reportable   01/08/17  16:21    


 


Ovalocytes  Not Reportable   01/08/17  16:21    


 


Helmet Cells  Not Reportable   01/08/17  16:21    


 


Guerrero-[place] Bodies  Not Reportable   01/08/17  16:21    


 


Cabot Rings  Not Reportable   01/08/17  16:21    


 


[place] Cells  Not Reportable   01/08/17  16:21    


 


Bite Cells  Not Reportable   01/08/17  16:21    


 


Crenated Cell  Not Reportable   01/08/17  16:21    


 


Elliptocytes  Not Reportable   01/08/17  16:21    


 


Acanthocytes (Spur)  Not Reportable   01/08/17  16:21    


 


Rouleaux  Not Reportable   01/08/17  16:21    


 


Hemoglobin C Crystals  Not Reportable   01/08/17  16:21    


 


Schistocytes  Not Reportable   01/08/17  16:21    


 


Malaria parasites  Not Reportable   01/08/17  16:21    


 


Tutu Bodies  Not Reportable   01/08/17  16:21    


 


Hem Pathologist Commnt  No   01/08/17  16:21    


 


Sodium  138 mmol/L (137-145)   01/08/17  16:21    


 


Potassium  4.3 mmol/L (3.6-5.0)   01/08/17  16:21    


 


Chloride  103.3 mmol/L ()   01/08/17  16:21    


 


Carbon Dioxide  21 mmol/L (22-30)  L  01/08/17  16:21    


 


Anion Gap  18 mmol/L  01/08/17  16:21    


 


BUN  7 mg/dL (7-17)   01/08/17  16:21    


 


Creatinine  0.5 mg/dL (0.7-1.2)  L  01/08/17  16:21    


 


Estimated GFR  > 60 ml/min  01/08/17  16:21    


 


BUN/Creatinine Ratio  14.00 %  01/08/17  16:21    


 


Glucose  82 mg/dL ()   01/08/17  16:21    


 


Calcium  9.0 mg/dL (8.4-10.2)   01/08/17  16:21    


 


Total Bilirubin  0.3 mg/dL (0.1-1.2)   01/08/17  16:21    


 


Direct Bilirubin  < 0.2 mg/dL (0-0.2)   01/07/17  16:25    


 


Indirect Bilirubin  0.0 mg/dL  01/07/17  16:25    


 


AST  15 units/L (5-40)   01/08/17  16:21    


 


ALT  12 units/L (7-56)   01/08/17  16:21    


 


Alkaline Phosphatase  70 units/L ()   01/08/17  16:21    


 


Total Creatine Kinase  50 units/L ()   01/08/17  16:21    


 


Total Protein  6.8 g/dL (6.3-8.2)   01/08/17  16:21    


 


Albumin  3.4 g/dL (3.9-5)  L  01/08/17  16:21    


 


Albumin/Globulin Ratio  1.0 %  01/08/17  16:21    


 


Urine Color  Rabia  (Yellow)   01/07/17  15:52    


 


Urine Turbidity  Slightly-cloudy  (Clear)   01/07/17  15:52    


 


Urine pH  5.0  (5.0-7.0)   01/07/17  15:52    


 


Ur Specific Gravity  1.024  (1.003-1.030)   01/07/17  15:52    


 


Urine Protein  30 mg/dl mg/dL (Negative)   01/07/17  15:52    


 


Urine Glucose (UA)  Neg mg/dL (Negative)   01/07/17  15:52    


 


Urine Ketones  Tr mg/dL (Negative)   01/07/17  15:52    


 


Urine Blood  Neg  (Negative)   01/07/17  15:52    


 


Urine Nitrite  Pos  (Negative)   01/07/17  15:52    


 


Urine Bilirubin  Neg  (Negative)   01/07/17  15:52    


 


Urine Urobilinogen  2.0 mg/dL (<2.0)   01/07/17  15:52    


 


Ur Leukocyte Esterase  Tr  (Negative)   01/07/17  15:52    


 


Urine WBC (Auto)  22.0 /HPF (0.0-6.0)  H  01/07/17  15:52    


 


Urine RBC (Auto)  3.0 /HPF (0.0-6.0)   01/07/17  15:52    


 


U Epithel Cells (Auto)  13.0 /HPF (0-13.0)   01/07/17  15:52    


 


Urine Bacteria (Auto)  1+ /HPF (Negative)   01/07/17  15:52    


 


Urine Mucus  3+ /HPF  01/07/17  15:52    


 


Urine HCG, Qual  Negative  (Negative)   01/07/17  15:52    


 


Salicylates  < 0.3 mg/dL (2.8-20.0)  L  01/07/17  16:25    


 


Urine Opiates Screen  Presumptive negative   01/07/17  15:52    


 


Urine Methadone Screen  Presumptive negative   01/07/17  15:52    


 


Acetaminophen  < 15.0 ug/mL (10.0-30.0)   01/07/17  16:25    


 


Ur Barbiturates Screen  Presumptive negative   01/07/17  15:52    


 


Ur Phencyclidine Scrn  Presumptive negative   01/07/17  15:52    


 


Ur Amphetamines Screen  Presumptive positive   01/07/17  15:52    


 


U Benzodiazepines Scrn  Presumptive negative   01/07/17  15:52    


 


Lithium  1.8 mmol/L (0.0-1.2)  H*  01/08/17  16:21    


 


Urine Cocaine Screen  Presumptive negative   01/07/17  15:52    


 


U Marijuana (THC) Screen  Presumptive negative   01/07/17  15:52    


 


Drugs of Abuse Note  Disclamer   01/07/17  15:52    


 


Plasma/Serum Alcohol  < 0.01 gm% (0-0.07)   01/07/17  16:25

## 2017-01-10 RX ADMIN — ZOLPIDEM TARTRATE PRN MG: 5 TABLET ORAL at 01:58

## 2017-01-10 RX ADMIN — ENOXAPARIN SODIUM SCH: 100 INJECTION SUBCUTANEOUS at 09:49

## 2017-01-10 RX ADMIN — CEFTRIAXONE SODIUM SCH: 1 INJECTION, POWDER, FOR SOLUTION INTRAMUSCULAR; INTRAVENOUS at 09:49

## 2017-01-10 NOTE — PROGRESS NOTE
Assessment and Plan


Assessment and plan: 





1. Acute psychosis with h/o schizophrenia.  Continue 1013 for safety and await 

psych consultation.





2. UTI.  Continue ceftriaxone.  Follow-up urine cultures.





3.  Lithium toxicity.  Continue to monitor her and hold lithium.





4. DVT prophylaxis- lovenox





History


Interval history: 


No new issues overnight.








Hospitalist Physical





- Constitutional


Vitals: 


 











Temp Pulse Resp BP Pulse Ox


 


 97.9 F   71   16   110/60   100 


 


 01/10/17 08:02  01/10/17 08:02  01/10/17 08:02  01/10/17 08:02  01/10/17 08:02











General appearance: Present: no acute distress, other (irriated)





- EENT


Eyes: Present: PERRL, EOM intact


ENT: hearing intact, clear oral mucosa, dentition normal





- Neck


Neck: Present: supple, normal ROM





- Respiratory


Respiratory effort: normal


Respiratory: bilateral: CTA





- Cardiovascular


Rhythm: regular


Heart Sounds: Present: S1 & S2.  Absent: gallop, rub





- Extremities


Extremities: no ischemia, No edema, Full ROM





- Abdominal


General gastrointestinal: soft, non-tender, non-distended, normal bowel sounds





- Integumentary


Integumentary: Present: clear, warm, dry





- Neurologic


Neurologic: CNII-XII intact, moves all extremities





Results





- Labs


CBC & Chem 7: 


 01/08/17 16:21





 01/08/17 16:21


Labs: 


 Laboratory Last Values











WBC  12.6 K/mm3 (4.5-11.0)  H  01/08/17  16:21    


 


RBC  4.48 M/mm3 (3.65-5.03)   01/08/17  16:21    


 


Hgb  12.9 gm/dl (10.1-14.3)   01/08/17  16:21    


 


Hct  39.3 % (30.3-42.9)   01/08/17  16:21    


 


MCV  88 fl (79-97)   01/08/17  16:21    


 


MCH  29 pg (28-32)   01/08/17  16:21    


 


MCHC  33 % (30-34)   01/08/17  16:21    


 


RDW  14.6 % (13.2-15.2)   01/08/17  16:21    


 


Plt Count  394 K/mm3 (140-440)   01/08/17  16:21    


 


Lymph #  Np   01/08/17  16:21    


 


Add Manual Diff  Complete   01/08/17  16:21    


 


Total Counted  100   01/08/17  16:21    


 


Seg Neuts % (Manual)  57.0 % (40.0-70.0)   01/08/17  16:21    


 


Band Neutrophils %  0 %  01/08/17  16:21    


 


Lymphocytes % (Manual)  38.0 % (13.4-35.0)  H  01/08/17  16:21    


 


Reactive Lymphs % (Man)  0 %  01/08/17  16:21    


 


Monocytes % (Manual)  4.0 % (0.0-7.3)   01/08/17  16:21    


 


Eosinophils % (Manual)  1.0 % (0.0-4.3)   01/08/17  16:21    


 


Basophils % (Manual)  0 % (0.0-1.8)   01/08/17  16:21    


 


Metamyelocytes %  0 %  01/08/17  16:21    


 


Myelocytes %  0 %  01/08/17  16:21    


 


Promyelocytes %  0 %  01/08/17  16:21    


 


Blast Cells %  0 %  01/08/17  16:21    


 


Nucleated RBC %  Not Reportable   01/08/17  16:21    


 


Seg Neutrophils # Man  7.2 K/mm3 (1.8-7.7)   01/08/17  16:21    


 


Band Neutrophils #  0.0 K/mm3  01/08/17  16:21    


 


Lymphocytes # (Manual)  4.8 K/mm3 (1.2-5.4)   01/08/17  16:21    


 


Abs React Lymphs (Man)  0.0 K/mm3  01/08/17  16:21    


 


Monocytes # (Manual)  0.5 K/mm3 (0.0-0.8)   01/08/17  16:21    


 


Eosinophils # (Manual)  0.1 K/mm3 (0.0-0.4)   01/08/17  16:21    


 


Basophils # (Manual)  0.0 K/mm3 (0.0-0.1)   01/08/17  16:21    


 


Metamyelocytes #  0.0 K/mm3  01/08/17  16:21    


 


Myelocytes #  0.0 K/mm3  01/08/17  16:21    


 


Promyelocytes #  0.0 K/mm3  01/08/17  16:21    


 


Blast Cells #  0.0 K/mm3  01/08/17  16:21    


 


WBC Morphology  Not Reportable   01/08/17  16:21    


 


Hypersegmented Neuts  Not Reportable   01/08/17  16:21    


 


Hyposegmented Neuts  Not Reportable   01/08/17  16:21    


 


Hypogranular Neuts  Not Reportable   01/08/17  16:21    


 


Smudge Cells  Not Reportable   01/08/17  16:21    


 


Toxic Granulation  Not Reportable   01/08/17  16:21    


 


Toxic Vacuolation  Not Reportable   01/08/17  16:21    


 


Dohle Bodies  Not Reportable   01/08/17  16:21    


 


Pelger-Huet Anomaly  Not Reportable   01/08/17  16:21    


 


Imer Rods  Not Reportable   01/08/17  16:21    


 


Platelet Estimate  Consistent w auto   01/08/17  16:21    


 


Clumped Platelets  Not Reportable   01/08/17  16:21    


 


Plt Clumps, EDTA  Not Reportable   01/08/17  16:21    


 


Large Platelets  Not Reportable   01/08/17  16:21    


 


Giant Platelets  Not Reportable   01/08/17  16:21    


 


Platelet Satelliting  Not Reportable   01/08/17  16:21    


 


Plt Morphology Comment  Not Reportable   01/08/17  16:21    


 


RBC Morphology  Not Reportable   01/08/17  16:21    


 


Dimorphic RBCs  Not Reportable   01/08/17  16:21    


 


Polychromasia  Not Reportable   01/08/17  16:21    


 


Hypochromasia  Not Reportable   01/08/17  16:21    


 


Poikilocytosis  Not Reportable   01/08/17  16:21    


 


Anisocytosis  1+   01/08/17  16:21    


 


Microcytosis  Not Reportable   01/08/17  16:21    


 


Macrocytosis  Not Reportable   01/08/17  16:21    


 


Spherocytes  Not Reportable   01/08/17  16:21    


 


Pappenheimer Bodies  Not Reportable   01/08/17  16:21    


 


Sickle Cells  Not Reportable   01/08/17  16:21    


 


Target Cells  Not Reportable   01/08/17  16:21    


 


Tear Drop Cells  Not Reportable   01/08/17  16:21    


 


Ovalocytes  Not Reportable   01/08/17  16:21    


 


Helmet Cells  Not Reportable   01/08/17  16:21    


 


Gu[place] Bodies  Not Reportable   01/08/17  16:21    


 


Cabot Rings  Not Reportable   01/08/17  16:21    


 


[place] Cells  Not Reportable   01/08/17  16:21    


 


Bite Cells  Not Reportable   01/08/17  16:21    


 


Crenated Cell  Not Reportable   01/08/17  16:21    


 


Elliptocytes  Not Reportable   01/08/17  16:21    


 


Acanthocytes (Spur)  Not Reportable   01/08/17  16:21    


 


Rouleaux  Not Reportable   01/08/17  16:21    


 


Hemoglobin C Crystals  Not Reportable   01/08/17  16:21    


 


Schistocytes  Not Reportable   01/08/17  16:21    


 


Malaria parasites  Not Reportable   01/08/17  16:21    


 


Tutu Bodies  Not Reportable   01/08/17  16:21    


 


Hem Pathologist Commnt  No   01/08/17  16:21    


 


Sodium  138 mmol/L (137-145)   01/08/17  16:21    


 


Potassium  4.3 mmol/L (3.6-5.0)   01/08/17  16:21    


 


Chloride  103.3 mmol/L ()   01/08/17  16:21    


 


Carbon Dioxide  21 mmol/L (22-30)  L  01/08/17  16:21    


 


Anion Gap  18 mmol/L  01/08/17  16:21    


 


BUN  7 mg/dL (7-17)   01/08/17  16:21    


 


Creatinine  0.5 mg/dL (0.7-1.2)  L  01/08/17  16:21    


 


Estimated GFR  > 60 ml/min  01/08/17  16:21    


 


BUN/Creatinine Ratio  14.00 %  01/08/17  16:21    


 


Glucose  82 mg/dL ()   01/08/17  16:21    


 


Calcium  9.0 mg/dL (8.4-10.2)   01/08/17  16:21    


 


Total Bilirubin  0.3 mg/dL (0.1-1.2)   01/08/17  16:21    


 


Direct Bilirubin  < 0.2 mg/dL (0-0.2)   01/07/17  16:25    


 


Indirect Bilirubin  0.0 mg/dL  01/07/17  16:25    


 


AST  15 units/L (5-40)   01/08/17  16:21    


 


ALT  12 units/L (7-56)   01/08/17  16:21    


 


Alkaline Phosphatase  70 units/L ()   01/08/17  16:21    


 


Total Creatine Kinase  50 units/L ()   01/08/17  16:21    


 


Total Protein  6.8 g/dL (6.3-8.2)   01/08/17  16:21    


 


Albumin  3.4 g/dL (3.9-5)  L  01/08/17  16:21    


 


Albumin/Globulin Ratio  1.0 %  01/08/17  16:21    


 


Urine Color  Rabia  (Yellow)   01/07/17  15:52    


 


Urine Turbidity  Slightly-cloudy  (Clear)   01/07/17  15:52    


 


Urine pH  5.0  (5.0-7.0)   01/07/17  15:52    


 


Ur Specific Gravity  1.024  (1.003-1.030)   01/07/17  15:52    


 


Urine Protein  30 mg/dl mg/dL (Negative)   01/07/17  15:52    


 


Urine Glucose (UA)  Neg mg/dL (Negative)   01/07/17  15:52    


 


Urine Ketones  Tr mg/dL (Negative)   01/07/17  15:52    


 


Urine Blood  Neg  (Negative)   01/07/17  15:52    


 


Urine Nitrite  Pos  (Negative)   01/07/17  15:52    


 


Urine Bilirubin  Neg  (Negative)   01/07/17  15:52    


 


Urine Urobilinogen  2.0 mg/dL (<2.0)   01/07/17  15:52    


 


Ur Leukocyte Esterase  Tr  (Negative)   01/07/17  15:52    


 


Urine WBC (Auto)  22.0 /HPF (0.0-6.0)  H  01/07/17  15:52    


 


Urine RBC (Auto)  3.0 /HPF (0.0-6.0)   01/07/17  15:52    


 


U Epithel Cells (Auto)  13.0 /HPF (0-13.0)   01/07/17  15:52    


 


Urine Bacteria (Auto)  1+ /HPF (Negative)   01/07/17  15:52    


 


Urine Mucus  3+ /HPF  01/07/17  15:52    


 


Urine HCG, Qual  Negative  (Negative)   01/07/17  15:52    


 


Salicylates  < 0.3 mg/dL (2.8-20.0)  L  01/07/17  16:25    


 


Urine Opiates Screen  Presumptive negative   01/07/17  15:52    


 


Urine Methadone Screen  Presumptive negative   01/07/17  15:52    


 


Acetaminophen  < 15.0 ug/mL (10.0-30.0)   01/07/17  16:25    


 


Ur Barbiturates Screen  Presumptive negative   01/07/17  15:52    


 


Ur Phencyclidine Scrn  Presumptive negative   01/07/17  15:52    


 


Ur Amphetamines Screen  Presumptive positive   01/07/17  15:52    


 


U Benzodiazepines Scrn  Presumptive negative   01/07/17  15:52    


 


Lithium  0.1 mmol/L (0.0-1.2)   01/10/17  05:50    


 


Urine Cocaine Screen  Presumptive negative   01/07/17  15:52    


 


U Marijuana (THC) Screen  Presumptive negative   01/07/17  15:52    


 


Drugs of Abuse Note  Disclamer   01/07/17  15:52    


 


Plasma/Serum Alcohol  < 0.01 gm% (0-0.07)   01/07/17  16:25

## 2017-01-11 LAB
ANION GAP SERPL CALC-SCNC: 21 MMOL/L
ANISOCYTOSIS BLD QL SMEAR: (no result)
BASOPHILS NFR BLD AUTO: 1.2 % (ref 0–1.8)
BLASTOCYTES % (MANUAL): 0 %
BUN SERPL-MCNC: 11 MG/DL (ref 7–17)
BUN/CREAT SERPL: 15.71 %
CALCIUM SERPL-MCNC: 9 MG/DL (ref 8.4–10.2)
CHLORIDE SERPL-SCNC: 102.7 MMOL/L (ref 98–107)
CO2 SERPL-SCNC: 23 MMOL/L (ref 22–30)
EOSINOPHIL NFR BLD AUTO: 1.9 % (ref 0–4.3)
GLUCOSE SERPL-MCNC: 87 MG/DL (ref 65–100)
HCT VFR BLD CALC: 43.1 % (ref 30.3–42.9)
HGB BLD-MCNC: 14 GM/DL (ref 10.1–14.3)
MCH RBC QN AUTO: 28 PG (ref 28–32)
MCHC RBC AUTO-ENTMCNC: 33 % (ref 30–34)
MCV RBC AUTO: 88 FL (ref 79–97)
PLATELET # BLD: 339 K/MM3 (ref 140–440)
POTASSIUM SERPL-SCNC: 4.6 MMOL/L (ref 3.6–5)
RBC # BLD AUTO: 4.93 M/MM3 (ref 3.65–5.03)
SODIUM SERPL-SCNC: 142 MMOL/L (ref 137–145)
TOTAL CELLS COUNTED PERCENT: 5
WBC # BLD AUTO: 12.2 K/MM3 (ref 4.5–11)

## 2017-01-11 RX ADMIN — ZOLPIDEM TARTRATE PRN MG: 5 TABLET ORAL at 20:59

## 2017-01-11 RX ADMIN — CEFTRIAXONE SODIUM SCH: 1 INJECTION, POWDER, FOR SOLUTION INTRAMUSCULAR; INTRAVENOUS at 10:00

## 2017-01-11 RX ADMIN — ENOXAPARIN SODIUM SCH: 100 INJECTION SUBCUTANEOUS at 10:00

## 2017-01-11 NOTE — PROGRESS NOTE
Assessment and Plan


Assessment and plan: 





1. Acute psychosis with h/o schizophrenia.  Continue 1013 for safety.   Mental 

health  reassessed pt; she presents manic, racing thoughts, disorganized

, restless, manipulative. Pt has been referred to [facility].  Patient is 

medically clear for transfer.





2.  UTI.  Continue ceftriaxone.  Follow-up urine cultures.





3.  Lithium toxicity.  Continue to monitor her and hold lithium.





4.  DVT prophylaxis- lovenox





History


Interval history: 


No new issues overnight.








Hospitalist Physical





- Constitutional


Vitals: 


 











Temp Pulse Resp BP Pulse Ox


 


 97.5 F L  63   16   106/57   98 


 


 01/11/17 08:00  01/11/17 08:00  01/11/17 08:00  01/11/17 08:00  01/11/17 08:00











General appearance: Present: no acute distress, other (irriated)





- EENT


Eyes: Present: PERRL, EOM intact


ENT: hearing intact, clear oral mucosa, dentition normal





- Neck


Neck: Present: supple, normal ROM





- Respiratory


Respiratory effort: normal


Respiratory: bilateral: CTA





- Cardiovascular


Rhythm: regular


Heart Sounds: Present: S1 & S2.  Absent: gallop, rub





- Extremities


Extremities: no ischemia, No edema, Full ROM





- Abdominal


General gastrointestinal: soft, non-tender, non-distended, normal bowel sounds





- Integumentary


Integumentary: Present: clear, warm, dry





- Neurologic


Neurologic: CNII-XII intact, moves all extremities





Results





- Labs


CBC & Chem 7: 


 01/08/17 16:21





 01/08/17 16:21


Labs: 


 Laboratory Last Values











WBC  12.6 K/mm3 (4.5-11.0)  H  01/08/17  16:21    


 


RBC  4.48 M/mm3 (3.65-5.03)   01/08/17  16:21    


 


Hgb  12.9 gm/dl (10.1-14.3)   01/08/17  16:21    


 


Hct  39.3 % (30.3-42.9)   01/08/17  16:21    


 


MCV  88 fl (79-97)   01/08/17  16:21    


 


MCH  29 pg (28-32)   01/08/17  16:21    


 


MCHC  33 % (30-34)   01/08/17  16:21    


 


RDW  14.6 % (13.2-15.2)   01/08/17  16:21    


 


Plt Count  394 K/mm3 (140-440)   01/08/17  16:21    


 


Lymph #  Np   01/08/17  16:21    


 


Add Manual Diff  Complete   01/08/17  16:21    


 


Total Counted  100   01/08/17  16:21    


 


Seg Neuts % (Manual)  57.0 % (40.0-70.0)   01/08/17  16:21    


 


Band Neutrophils %  0 %  01/08/17  16:21    


 


Lymphocytes % (Manual)  38.0 % (13.4-35.0)  H  01/08/17  16:21    


 


Reactive Lymphs % (Man)  0 %  01/08/17  16:21    


 


Monocytes % (Manual)  4.0 % (0.0-7.3)   01/08/17  16:21    


 


Eosinophils % (Manual)  1.0 % (0.0-4.3)   01/08/17  16:21    


 


Basophils % (Manual)  0 % (0.0-1.8)   01/08/17  16:21    


 


Metamyelocytes %  0 %  01/08/17  16:21    


 


Myelocytes %  0 %  01/08/17  16:21    


 


Promyelocytes %  0 %  01/08/17  16:21    


 


Blast Cells %  0 %  01/08/17  16:21    


 


Nucleated RBC %  Not Reportable   01/08/17  16:21    


 


Seg Neutrophils # Man  7.2 K/mm3 (1.8-7.7)   01/08/17  16:21    


 


Band Neutrophils #  0.0 K/mm3  01/08/17  16:21    


 


Lymphocytes # (Manual)  4.8 K/mm3 (1.2-5.4)   01/08/17  16:21    


 


Abs React Lymphs (Man)  0.0 K/mm3  01/08/17  16:21    


 


Monocytes # (Manual)  0.5 K/mm3 (0.0-0.8)   01/08/17  16:21    


 


Eosinophils # (Manual)  0.1 K/mm3 (0.0-0.4)   01/08/17  16:21    


 


Basophils # (Manual)  0.0 K/mm3 (0.0-0.1)   01/08/17  16:21    


 


Metamyelocytes #  0.0 K/mm3  01/08/17  16:21    


 


Myelocytes #  0.0 K/mm3  01/08/17  16:21    


 


Promyelocytes #  0.0 K/mm3  01/08/17  16:21    


 


Blast Cells #  0.0 K/mm3  01/08/17  16:21    


 


WBC Morphology  Not Reportable   01/08/17  16:21    


 


Hypersegmented Neuts  Not Reportable   01/08/17  16:21    


 


Hyposegmented Neuts  Not Reportable   01/08/17  16:21    


 


Hypogranular Neuts  Not Reportable   01/08/17  16:21    


 


Smudge Cells  Not Reportable   01/08/17  16:21    


 


Toxic Granulation  Not Reportable   01/08/17  16:21    


 


Toxic Vacuolation  Not Reportable   01/08/17  16:21    


 


Dohle Bodies  Not Reportable   01/08/17  16:21    


 


Pelger-Huet Anomaly  Not Reportable   01/08/17  16:21    


 


Imer Rods  Not Reportable   01/08/17  16:21    


 


Platelet Estimate  Consistent w auto   01/08/17  16:21    


 


Clumped Platelets  Not Reportable   01/08/17  16:21    


 


Plt Clumps, EDTA  Not Reportable   01/08/17  16:21    


 


Large Platelets  Not Reportable   01/08/17  16:21    


 


Giant Platelets  Not Reportable   01/08/17  16:21    


 


Platelet Satelliting  Not Reportable   01/08/17  16:21    


 


Plt Morphology Comment  Not Reportable   01/08/17  16:21    


 


RBC Morphology  Not Reportable   01/08/17  16:21    


 


Dimorphic RBCs  Not Reportable   01/08/17  16:21    


 


Polychromasia  Not Reportable   01/08/17  16:21    


 


Hypochromasia  Not Reportable   01/08/17  16:21    


 


Poikilocytosis  Not Reportable   01/08/17  16:21    


 


Anisocytosis  1+   01/08/17  16:21    


 


Microcytosis  Not Reportable   01/08/17  16:21    


 


Macrocytosis  Not Reportable   01/08/17  16:21    


 


Spherocytes  Not Reportable   01/08/17  16:21    


 


Pappenheimer Bodies  Not Reportable   01/08/17  16:21    


 


Sickle Cells  Not Reportable   01/08/17  16:21    


 


Target Cells  Not Reportable   01/08/17  16:21    


 


Tear Drop Cells  Not Reportable   01/08/17  16:21    


 


Ovalocytes  Not Reportable   01/08/17  16:21    


 


Helmet Cells  Not Reportable   01/08/17  16:21    


 


Guerrero-[place] Bodies  Not Reportable   01/08/17  16:21    


 


Cabot Rings  Not Reportable   01/08/17  16:21    


 


[place] Cells  Not Reportable   01/08/17  16:21    


 


Bite Cells  Not Reportable   01/08/17  16:21    


 


Crenated Cell  Not Reportable   01/08/17  16:21    


 


Elliptocytes  Not Reportable   01/08/17  16:21    


 


Acanthocytes (Spur)  Not Reportable   01/08/17  16:21    


 


Rouleaux  Not Reportable   01/08/17  16:21    


 


Hemoglobin C Crystals  Not Reportable   01/08/17  16:21    


 


Schistocytes  Not Reportable   01/08/17  16:21    


 


Malaria parasites  Not Reportable   01/08/17  16:21    


 


Tutu Bodies  Not Reportable   01/08/17  16:21    


 


Hem Pathologist Commnt  No   01/08/17  16:21    


 


Sodium  138 mmol/L (137-145)   01/08/17  16:21    


 


Potassium  4.3 mmol/L (3.6-5.0)   01/08/17  16:21    


 


Chloride  103.3 mmol/L ()   01/08/17  16:21    


 


Carbon Dioxide  21 mmol/L (22-30)  L  01/08/17  16:21    


 


Anion Gap  18 mmol/L  01/08/17  16:21    


 


BUN  7 mg/dL (7-17)   01/08/17  16:21    


 


Creatinine  0.5 mg/dL (0.7-1.2)  L  01/08/17  16:21    


 


Estimated GFR  > 60 ml/min  01/08/17  16:21    


 


BUN/Creatinine Ratio  14.00 %  01/08/17  16:21    


 


Glucose  82 mg/dL ()   01/08/17  16:21    


 


Calcium  9.0 mg/dL (8.4-10.2)   01/08/17  16:21    


 


Total Bilirubin  0.3 mg/dL (0.1-1.2)   01/08/17  16:21    


 


Direct Bilirubin  < 0.2 mg/dL (0-0.2)   01/07/17  16:25    


 


Indirect Bilirubin  0.0 mg/dL  01/07/17  16:25    


 


AST  15 units/L (5-40)   01/08/17  16:21    


 


ALT  12 units/L (7-56)   01/08/17  16:21    


 


Alkaline Phosphatase  70 units/L ()   01/08/17  16:21    


 


Total Creatine Kinase  50 units/L ()   01/08/17  16:21    


 


Total Protein  6.8 g/dL (6.3-8.2)   01/08/17  16:21    


 


Albumin  3.4 g/dL (3.9-5)  L  01/08/17  16:21    


 


Albumin/Globulin Ratio  1.0 %  01/08/17  16:21    


 


Urine Color  Rabia  (Yellow)   01/07/17  15:52    


 


Urine Turbidity  Slightly-cloudy  (Clear)   01/07/17  15:52    


 


Urine pH  5.0  (5.0-7.0)   01/07/17  15:52    


 


Ur Specific Gravity  1.024  (1.003-1.030)   01/07/17  15:52    


 


Urine Protein  30 mg/dl mg/dL (Negative)   01/07/17  15:52    


 


Urine Glucose (UA)  Neg mg/dL (Negative)   01/07/17  15:52    


 


Urine Ketones  Tr mg/dL (Negative)   01/07/17  15:52    


 


Urine Blood  Neg  (Negative)   01/07/17  15:52    


 


Urine Nitrite  Pos  (Negative)   01/07/17  15:52    


 


Urine Bilirubin  Neg  (Negative)   01/07/17  15:52    


 


Urine Urobilinogen  2.0 mg/dL (<2.0)   01/07/17  15:52    


 


Ur Leukocyte Esterase  Tr  (Negative)   01/07/17  15:52    


 


Urine WBC (Auto)  22.0 /HPF (0.0-6.0)  H  01/07/17  15:52    


 


Urine RBC (Auto)  3.0 /HPF (0.0-6.0)   01/07/17  15:52    


 


U Epithel Cells (Auto)  13.0 /HPF (0-13.0)   01/07/17  15:52    


 


Urine Bacteria (Auto)  1+ /HPF (Negative)   01/07/17  15:52    


 


Urine Mucus  3+ /HPF  01/07/17  15:52    


 


Urine HCG, Qual  Negative  (Negative)   01/07/17  15:52    


 


Salicylates  < 0.3 mg/dL (2.8-20.0)  L  01/07/17  16:25    


 


Urine Opiates Screen  Presumptive negative   01/07/17  15:52    


 


Urine Methadone Screen  Presumptive negative   01/07/17  15:52    


 


Acetaminophen  < 15.0 ug/mL (10.0-30.0)   01/07/17  16:25    


 


Ur Barbiturates Screen  Presumptive negative   01/07/17  15:52    


 


Ur Phencyclidine Scrn  Presumptive negative   01/07/17  15:52    


 


Ur Amphetamines Screen  Presumptive positive   01/07/17  15:52    


 


U Benzodiazepines Scrn  Presumptive negative   01/07/17  15:52    


 


Lithium  0.1 mmol/L (0.0-1.2)   01/10/17  05:50    


 


Urine Cocaine Screen  Presumptive negative   01/07/17  15:52    


 


U Marijuana (THC) Screen  Presumptive negative   01/07/17  15:52    


 


Drugs of Abuse Note  Disclamer   01/07/17  15:52    


 


Plasma/Serum Alcohol  < 0.01 gm% (0-0.07)   01/07/17  16:25

## 2017-01-12 LAB
ANION GAP SERPL CALC-SCNC: 20 MMOL/L
BASOPHILS NFR BLD AUTO: 0.4 % (ref 0–1.8)
BUN SERPL-MCNC: 11 MG/DL (ref 7–17)
BUN/CREAT SERPL: 18.33 %
CALCIUM SERPL-MCNC: 8.8 MG/DL (ref 8.4–10.2)
CHLORIDE SERPL-SCNC: 105.1 MMOL/L (ref 98–107)
CO2 SERPL-SCNC: 21 MMOL/L (ref 22–30)
EOSINOPHIL NFR BLD AUTO: 1.6 % (ref 0–4.3)
GLUCOSE SERPL-MCNC: 129 MG/DL (ref 65–100)
HCT VFR BLD CALC: 42 % (ref 30.3–42.9)
HGB BLD-MCNC: 13.9 GM/DL (ref 10.1–14.3)
MCH RBC QN AUTO: 29 PG (ref 28–32)
MCHC RBC AUTO-ENTMCNC: 33 % (ref 30–34)
MCV RBC AUTO: 88 FL (ref 79–97)
PLATELET # BLD: 331 K/MM3 (ref 140–440)
POTASSIUM SERPL-SCNC: 4.7 MMOL/L (ref 3.6–5)
RBC # BLD AUTO: 4.78 M/MM3 (ref 3.65–5.03)
SODIUM SERPL-SCNC: 141 MMOL/L (ref 137–145)
WBC # BLD AUTO: 9.7 K/MM3 (ref 4.5–11)

## 2017-01-12 RX ADMIN — ZOLPIDEM TARTRATE PRN MG: 5 TABLET ORAL at 23:21

## 2017-01-12 RX ADMIN — ENOXAPARIN SODIUM SCH: 100 INJECTION SUBCUTANEOUS at 09:39

## 2017-01-12 RX ADMIN — CEFTRIAXONE SODIUM SCH: 1 INJECTION, POWDER, FOR SOLUTION INTRAMUSCULAR; INTRAVENOUS at 09:40

## 2017-01-12 NOTE — DISCHARGE SUMMARY
[id number]


01/12/17


Attending physician: 


NABILA BENITO





 





01/07/17 22:12


Consult to Mental Health [CONS] Routine 


   Reason For Exam: lithium toxicity+placement in psych facility once


   Place consult to:: 


   Notified:: 


   Was contact made?: Yes


   Comment:: KAYLA SPOKE WITH 











Primary care physician: 


PRIMARY CARE MD








Hospitalization


Reason for admission: acute psychosis


Condition: Fair


Hospital course: 


21-year-old female who presented through the emergency department with 

aggressive and manic behavior.  Patient has a history of schizophrenia and was 

found to have acute psychosis.  Clinically, patient was noted to have urinary 

tract infection and lithium toxicity.  Lithium was held in patient's level 

returned to normal.  Urinary tract infection was treated with Rocephin.  

Identification and Sensitivities are still pending on the organism.  Patient 

was evaluated by mental health  was found to be manic with racing 

thoughts.  Patient was disorganized, restless and manipulative. Pt has been 

referred to [facility].  





Disposition: DC/TX ANOTHER TYPE HEALTHCARE





Core Measure Documentation





- Palliative Care


Palliative Care/ Comfort Measures: Not Applicable





- Core Measures


Any of the following diagnoses?: none





Exam





- Constitutional


Vitals: 


 











Temp Pulse Resp BP Pulse Ox


 


 97 F L  70   16   106/55   98 


 


 01/12/17 08:00  01/12/17 08:00  01/12/17 08:00  01/12/17 08:00  01/12/17 08:00











General appearance: Present: no acute distress, well-nourished





- EENT


Eyes: Present: PERRL


ENT: hearing intact, clear oral mucosa





- Neck


Neck: Present: supple, normal ROM





- Respiratory


Respiratory effort: normal


Respiratory: bilateral: CTA





- Cardiovascular


Heart Sounds: Present: S1 & S2.  Absent: rub, click





- Extremities


Extremities: pulses symmetrical, No edema


Peripheral Pulses: within normal limits





- Abdominal


General gastrointestinal: Present: soft, non-tender, non-distended, normal 

bowel sounds


Female genitourinary: Present: normal





- Integumentary


Integumentary: Present: clear, warm, dry





- Musculoskeletal


Musculoskeletal: gait normal, strength equal bilaterally





- Psychiatric


Psychiatric: appropriate mood/affect, intact judgment & insight





- Neurologic


Neurologic: CNII-XII intact, moves all extremities





Plan


Activity: no restrictions


Weight Bearing Status: Full Weight Bearing


Diet: regular


Follow up with: 


PRIMARY CARE,MD [Primary Care Provider] - 3-5 Days

## 2017-01-12 NOTE — PROGRESS NOTE
Assessment and Plan


Assessment and plan: 





1. Acute psychosis with h/o schizophrenia.  Continue 1013 for safety.   Mental 

health  reassessed pt; she presents manic, racing thoughts, disorganized

, restless, manipulative. Pt has been referred to [facility].  





2.  UTI.  Continue ceftriaxone.  Urine culture reveals gram-negative rods.  

Await ID and sensitivities.  We will hold on transfer until blood cultures and 

sensitivities are obtained.





3.  Lithium toxicity.  Continue to monitor her and hold lithium.





4.  DVT prophylaxis- lovenox





History


Interval history: 


No new issues overnight.








Hospitalist Physical





- Constitutional


Vitals: 


 











Temp Pulse Resp BP Pulse Ox


 


 97 F L  70   16   106/55   98 


 


 01/12/17 08:00  01/12/17 08:00  01/12/17 08:00  01/12/17 08:00  01/12/17 08:00











General appearance: Present: no acute distress, other (irriated)





- EENT


Eyes: Present: PERRL, EOM intact


ENT: hearing intact, clear oral mucosa, dentition normal





- Neck


Neck: Present: supple, normal ROM





- Respiratory


Respiratory effort: normal


Respiratory: bilateral: CTA





- Cardiovascular


Rhythm: regular


Heart Sounds: Present: S1 & S2.  Absent: gallop, rub





- Extremities


Extremities: no ischemia, No edema, Full ROM





- Abdominal


General gastrointestinal: soft, non-tender, non-distended, normal bowel sounds





- Integumentary


Integumentary: Present: clear, warm, dry





- Neurologic


Neurologic: CNII-XII intact, moves all extremities





Results





- Labs


CBC & Chem 7: 


 01/11/17 12:44





 01/11/17 12:44


Labs: 


 Laboratory Last Values











WBC  12.2 K/mm3 (4.5-11.0)  H  01/11/17  12:44    


 


RBC  4.93 M/mm3 (3.65-5.03)   01/11/17  12:44    


 


Hgb  14.0 gm/dl (10.1-14.3)   01/11/17  12:44    


 


Hct  43.1 % (30.3-42.9)  H  01/11/17  12:44    


 


MCV  88 fl (79-97)   01/11/17  12:44    


 


MCH  28 pg (28-32)   01/11/17  12:44    


 


MCHC  33 % (30-34)   01/11/17  12:44    


 


RDW  14.7 % (13.2-15.2)   01/11/17  12:44    


 


Plt Count  339 K/mm3 (140-440)   01/11/17  12:44    


 


Lymph % (Auto)  35.2 % (13.4-35.0)  H  01/11/17  12:44    


 


Mono % (Auto)  6.1 % (0.0-7.3)   01/11/17  12:44    


 


Eos % (Auto)  1.9 % (0.0-4.3)   01/11/17  12:44    


 


Baso % (Auto)  1.2 % (0.0-1.8)   01/11/17  12:44    


 


Lymph #  4.3 K/mm3 (1.2-5.4)   01/11/17  12:44    


 


Mono #  0.7 K/mm3 (0.0-0.8)   01/11/17  12:44    


 


Eos #  0.2 K/mm3 (0.0-0.4)   01/11/17  12:44    


 


Baso #  0.1 K/mm3 (0.0-0.1)   01/11/17  12:44    


 


Add Manual Diff  Complete   01/11/17  12:44    


 


Total Counted  100   01/11/17  12:44    


 


Seg Neutrophils %  55.6 % (40.0-70.0)   01/11/17  12:44    


 


Seg Neuts % (Manual)  56.0 % (40.0-70.0)   01/11/17  12:44    


 


Band Neutrophils %  0 %  01/11/17  12:44    


 


Lymphocytes % (Manual)  39.0 % (13.4-35.0)  H  01/11/17  12:44    


 


Reactive Lymphs % (Man)  0 %  01/11/17  12:44    


 


Monocytes % (Manual)  5.0 % (0.0-7.3)   01/11/17  12:44    


 


Eosinophils % (Manual)  0 % (0.0-4.3)   01/11/17  12:44    


 


Basophils % (Manual)  0 % (0.0-1.8)   01/11/17  12:44    


 


Metamyelocytes %  0 %  01/11/17  12:44    


 


Myelocytes %  0 %  01/11/17  12:44    


 


Promyelocytes %  0 %  01/11/17  12:44    


 


Blast Cells %  0 %  01/11/17  12:44    


 


Nucleated RBC %  Not Reportable   01/11/17  12:44    


 


Seg Neutrophils #  6.8 K/mm3 (1.8-7.7)   01/11/17  12:44    


 


Seg Neutrophils # Man  6.8 K/mm3 (1.8-7.7)   01/11/17  12:44    


 


Band Neutrophils #  0.0 K/mm3  01/11/17  12:44    


 


Lymphocytes # (Manual)  4.8 K/mm3 (1.2-5.4)   01/11/17  12:44    


 


Abs React Lymphs (Man)  0.0 K/mm3  01/11/17  12:44    


 


Monocytes # (Manual)  0.6 K/mm3 (0.0-0.8)   01/11/17  12:44    


 


Eosinophils # (Manual)  0.0 K/mm3 (0.0-0.4)   01/11/17  12:44    


 


Basophils # (Manual)  0.0 K/mm3 (0.0-0.1)   01/11/17  12:44    


 


Metamyelocytes #  0.0 K/mm3  01/11/17  12:44    


 


Myelocytes #  0.0 K/mm3  01/11/17  12:44    


 


Promyelocytes #  0.0 K/mm3  01/11/17  12:44    


 


Blast Cells #  0.0 K/mm3  01/11/17  12:44    


 


WBC Morphology  Not Reportable   01/11/17  12:44    


 


Hypersegmented Neuts  Not Reportable   01/11/17  12:44    


 


Hyposegmented Neuts  Not Reportable   01/11/17  12:44    


 


Hypogranular Neuts  Not Reportable   01/11/17  12:44    


 


Smudge Cells  Not Reportable   01/11/17  12:44    


 


Toxic Granulation  Not Reportable   01/11/17  12:44    


 


Toxic Vacuolation  Not Reportable   01/11/17  12:44    


 


Dohle Bodies  Not Reportable   01/11/17  12:44    


 


Pelger-Huet Anomaly  Not Reportable   01/11/17  12:44    


 


Imer Rods  Not Reportable   01/11/17  12:44    


 


Platelet Estimate  Appears normal   01/11/17  12:44    


 


Clumped Platelets  Not Reportable   01/11/17  12:44    


 


Plt Clumps, EDTA  Not Reportable   01/11/17  12:44    


 


Large Platelets  Not Reportable   01/11/17  12:44    


 


Giant Platelets  Not Reportable   01/11/17  12:44    


 


Platelet Satelliting  Not Reportable   01/11/17  12:44    


 


Plt Morphology Comment  Not Reportable   01/11/17  12:44    


 


RBC Morphology  Not Reportable   01/11/17  12:44    


 


Dimorphic RBCs  Not Reportable   01/11/17  12:44    


 


Polychromasia  Not Reportable   01/11/17  12:44    


 


Hypochromasia  Not Reportable   01/11/17  12:44    


 


Poikilocytosis  Not Reportable   01/11/17  12:44    


 


Anisocytosis  1+   01/11/17  12:44    


 


Microcytosis  Not Reportable   01/11/17  12:44    


 


Macrocytosis  Not Reportable   01/11/17  12:44    


 


Spherocytes  Not Reportable   01/11/17  12:44    


 


Pappenheimer Bodies  Not Reportable   01/11/17  12:44    


 


Sickle Cells  Not Reportable   01/11/17  12:44    


 


Target Cells  Not Reportable   01/11/17  12:44    


 


Tear Drop Cells  Not Reportable   01/11/17  12:44    


 


Ovalocytes  Not Reportable   01/11/17  12:44    


 


Helmet Cells  Not Reportable   01/11/17  12:44    


 


Guerrero-[place] Bodies  Not Reportable   01/11/17  12:44    


 


Cabot Rings  Not Reportable   01/11/17  12:44    


 


Ashok Cells  Not Reportable   01/11/17  12:44    


 


Bite Cells  Not Reportable   01/11/17  12:44    


 


Crenated Cell  Not Reportable   01/11/17  12:44    


 


Elliptocytes  Not Reportable   01/11/17  12:44    


 


Acanthocytes (Spur)  Not Reportable   01/11/17  12:44    


 


Rouleaux  Not Reportable   01/11/17  12:44    


 


Hemoglobin C Crystals  Not Reportable   01/11/17  12:44    


 


Schistocytes  Not Reportable   01/11/17  12:44    


 


Malaria parasites  Not Reportable   01/11/17  12:44    


 


Tutu Bodies  Not Reportable   01/11/17  12:44    


 


Hem Pathologist Commnt  No   01/11/17  12:44    


 


Sodium  142 mmol/L (137-145)   01/11/17  12:44    


 


Potassium  4.6 mmol/L (3.6-5.0)   01/11/17  12:44    


 


Chloride  102.7 mmol/L ()   01/11/17  12:44    


 


Carbon Dioxide  23 mmol/L (22-30)   01/11/17  12:44    


 


Anion Gap  21 mmol/L  01/11/17  12:44    


 


BUN  11 mg/dL (7-17)   01/11/17  12:44    


 


Creatinine  0.7 mg/dL (0.7-1.2)   01/11/17  12:44    


 


Estimated GFR  > 60 ml/min  01/11/17  12:44    


 


BUN/Creatinine Ratio  15.71 %  01/11/17  12:44    


 


Glucose  87 mg/dL ()   01/11/17  12:44    


 


Calcium  9.0 mg/dL (8.4-10.2)   01/11/17  12:44    


 


Total Bilirubin  0.3 mg/dL (0.1-1.2)   01/08/17  16:21    


 


Direct Bilirubin  < 0.2 mg/dL (0-0.2)   01/07/17  16:25    


 


Indirect Bilirubin  0.0 mg/dL  01/07/17  16:25    


 


AST  15 units/L (5-40)   01/08/17  16:21    


 


ALT  12 units/L (7-56)   01/08/17  16:21    


 


Alkaline Phosphatase  70 units/L ()   01/08/17  16:21    


 


Total Creatine Kinase  50 units/L ()   01/08/17  16:21    


 


Total Protein  6.8 g/dL (6.3-8.2)   01/08/17  16:21    


 


Albumin  3.4 g/dL (3.9-5)  L  01/08/17  16:21    


 


Albumin/Globulin Ratio  1.0 %  01/08/17  16:21    


 


Urine Color  Rabia  (Yellow)   01/07/17  15:52    


 


Urine Turbidity  Slightly-cloudy  (Clear)   01/07/17  15:52    


 


Urine pH  5.0  (5.0-7.0)   01/07/17  15:52    


 


Ur Specific Gravity  1.024  (1.003-1.030)   01/07/17  15:52    


 


Urine Protein  30 mg/dl mg/dL (Negative)   01/07/17  15:52    


 


Urine Glucose (UA)  Neg mg/dL (Negative)   01/07/17  15:52    


 


Urine Ketones  Tr mg/dL (Negative)   01/07/17  15:52    


 


Urine Blood  Neg  (Negative)   01/07/17  15:52    


 


Urine Nitrite  Pos  (Negative)   01/07/17  15:52    


 


Urine Bilirubin  Neg  (Negative)   01/07/17  15:52    


 


Urine Urobilinogen  2.0 mg/dL (<2.0)   01/07/17  15:52    


 


Ur Leukocyte Esterase  Tr  (Negative)   01/07/17  15:52    


 


Urine WBC (Auto)  22.0 /HPF (0.0-6.0)  H  01/07/17  15:52    


 


Urine RBC (Auto)  3.0 /HPF (0.0-6.0)   01/07/17  15:52    


 


U Epithel Cells (Auto)  13.0 /HPF (0-13.0)   01/07/17  15:52    


 


Urine Bacteria (Auto)  1+ /HPF (Negative)   01/07/17  15:52    


 


Urine Mucus  3+ /HPF  01/07/17  15:52    


 


Urine HCG, Qual  Negative  (Negative)   01/07/17  15:52    


 


Salicylates  < 0.3 mg/dL (2.8-20.0)  L  01/07/17  16:25    


 


Urine Opiates Screen  Presumptive negative   01/07/17  15:52    


 


Urine Methadone Screen  Presumptive negative   01/07/17  15:52    


 


Acetaminophen  < 15.0 ug/mL (10.0-30.0)   01/07/17  16:25    


 


Ur Barbiturates Screen  Presumptive negative   01/07/17  15:52    


 


Ur Phencyclidine Scrn  Presumptive negative   01/07/17  15:52    


 


Ur Amphetamines Screen  Presumptive positive   01/07/17  15:52    


 


U Benzodiazepines Scrn  Presumptive negative   01/07/17  15:52    


 


Lithium  0.1 mmol/L (0.0-1.2)   01/10/17  05:50    


 


Urine Cocaine Screen  Presumptive negative   01/07/17  15:52    


 


U Marijuana (THC) Screen  Presumptive negative   01/07/17  15:52    


 


Drugs of Abuse Note  Disclamer   01/07/17  15:52    


 


Plasma/Serum Alcohol  < 0.01 gm% (0-0.07)   01/07/17  16:25

## 2017-01-13 RX ADMIN — ZOLPIDEM TARTRATE PRN MG: 5 TABLET ORAL at 22:27

## 2017-01-13 RX ADMIN — FAMOTIDINE SCH MG: 20 TABLET ORAL at 14:53

## 2017-01-13 RX ADMIN — FAMOTIDINE SCH MG: 20 TABLET ORAL at 22:27

## 2017-01-13 RX ADMIN — ENOXAPARIN SODIUM SCH: 100 INJECTION SUBCUTANEOUS at 13:05

## 2017-01-13 RX ADMIN — CEFTRIAXONE SODIUM SCH MLS/HR: 1 INJECTION, POWDER, FOR SOLUTION INTRAMUSCULAR; INTRAVENOUS at 11:41

## 2017-01-13 NOTE — PROGRESS NOTE
Assessment and Plan


Assessment and plan: 





1. Acute psychosis with h/o schizophrenia.  Continue 1013 for safety.   Mental 

health  reassessed pt; she presents manic, racing thoughts, disorganized

, restless, manipulative. Pt has been referred to [facility].  





2.  Escherichia coli UTI.  Follow-up blood cultures.  Pt. is medically stable 

for discharge.  Cont. Rocephin.  Patient will be changed to by mouth medication 

at discharge.





3.  Lithium toxicity.  Continue to monitor her and hold lithium.





4.  DVT prophylaxis- lovenox





History


Interval history: 


No new issues overnight.








Hospitalist Physical





- Constitutional


Vitals: 


 











Temp Pulse Resp BP Pulse Ox


 


 97.9 F   76   20   101/60   97 


 


 01/13/17 08:07  01/13/17 08:07  01/13/17 08:07  01/13/17 08:07  01/13/17 08:07











General appearance: Present: no acute distress, well-nourished





- EENT


Eyes: Present: PERRL, EOM intact


ENT: hearing intact, clear oral mucosa, dentition normal





- Neck


Neck: Present: supple, normal ROM





- Respiratory


Respiratory effort: normal


Respiratory: bilateral: CTA





- Cardiovascular


Rhythm: regular


Heart Sounds: Present: S1 & S2.  Absent: gallop, rub





- Extremities


Extremities: no ischemia, No edema, Full ROM





- Abdominal


General gastrointestinal: soft, non-tender, non-distended, normal bowel sounds





- Integumentary


Integumentary: Present: clear, warm, dry





- Neurologic


Neurologic: CNII-XII intact, moves all extremities





Results





- Labs


CBC & Chem 7: 


 01/12/17 13:48





 01/12/17 13:48


Labs: 


 Laboratory Last Values











WBC  9.7 K/mm3 (4.5-11.0)   01/12/17  13:48    


 


RBC  4.78 M/mm3 (3.65-5.03)   01/12/17  13:48    


 


Hgb  13.9 gm/dl (10.1-14.3)   01/12/17  13:48    


 


Hct  42.0 % (30.3-42.9)   01/12/17  13:48    


 


MCV  88 fl (79-97)   01/12/17  13:48    


 


MCH  29 pg (28-32)   01/12/17  13:48    


 


MCHC  33 % (30-34)   01/12/17  13:48    


 


RDW  14.9 % (13.2-15.2)   01/12/17  13:48    


 


Plt Count  331 K/mm3 (140-440)   01/12/17  13:48    


 


Lymph % (Auto)  35.2 % (13.4-35.0)  H  01/12/17  13:48    


 


Mono % (Auto)  5.6 % (0.0-7.3)   01/12/17  13:48    


 


Eos % (Auto)  1.6 % (0.0-4.3)   01/12/17  13:48    


 


Baso % (Auto)  0.4 % (0.0-1.8)   01/12/17  13:48    


 


Lymph #  3.4 K/mm3 (1.2-5.4)   01/12/17  13:48    


 


Mono #  0.5 K/mm3 (0.0-0.8)   01/12/17  13:48    


 


Eos #  0.2 K/mm3 (0.0-0.4)   01/12/17  13:48    


 


Baso #  0.0 K/mm3 (0.0-0.1)   01/12/17  13:48    


 


Add Manual Diff  Complete   01/11/17  12:44    


 


Total Counted  100   01/11/17  12:44    


 


Seg Neutrophils %  57.2 % (40.0-70.0)   01/12/17  13:48    


 


Seg Neuts % (Manual)  56.0 % (40.0-70.0)   01/11/17  12:44    


 


Band Neutrophils %  0 %  01/11/17  12:44    


 


Lymphocytes % (Manual)  39.0 % (13.4-35.0)  H  01/11/17  12:44    


 


Reactive Lymphs % (Man)  0 %  01/11/17  12:44    


 


Monocytes % (Manual)  5.0 % (0.0-7.3)   01/11/17  12:44    


 


Eosinophils % (Manual)  0 % (0.0-4.3)   01/11/17  12:44    


 


Basophils % (Manual)  0 % (0.0-1.8)   01/11/17  12:44    


 


Metamyelocytes %  0 %  01/11/17  12:44    


 


Myelocytes %  0 %  01/11/17  12:44    


 


Promyelocytes %  0 %  01/11/17  12:44    


 


Blast Cells %  0 %  01/11/17  12:44    


 


Nucleated RBC %  Not Reportable   01/11/17  12:44    


 


Seg Neutrophils #  5.6 K/mm3 (1.8-7.7)   01/12/17  13:48    


 


Seg Neutrophils # Man  6.8 K/mm3 (1.8-7.7)   01/11/17  12:44    


 


Band Neutrophils #  0.0 K/mm3  01/11/17  12:44    


 


Lymphocytes # (Manual)  4.8 K/mm3 (1.2-5.4)   01/11/17  12:44    


 


Abs React Lymphs (Man)  0.0 K/mm3  01/11/17  12:44    


 


Monocytes # (Manual)  0.6 K/mm3 (0.0-0.8)   01/11/17  12:44    


 


Eosinophils # (Manual)  0.0 K/mm3 (0.0-0.4)   01/11/17  12:44    


 


Basophils # (Manual)  0.0 K/mm3 (0.0-0.1)   01/11/17  12:44    


 


Metamyelocytes #  0.0 K/mm3  01/11/17  12:44    


 


Myelocytes #  0.0 K/mm3  01/11/17  12:44    


 


Promyelocytes #  0.0 K/mm3  01/11/17  12:44    


 


Blast Cells #  0.0 K/mm3  01/11/17  12:44    


 


WBC Morphology  Not Reportable   01/11/17  12:44    


 


Hypersegmented Neuts  Not Reportable   01/11/17  12:44    


 


Hyposegmented Neuts  Not Reportable   01/11/17  12:44    


 


Hypogranular Neuts  Not Reportable   01/11/17  12:44    


 


Smudge Cells  Not Reportable   01/11/17  12:44    


 


Toxic Granulation  Not Reportable   01/11/17  12:44    


 


Toxic Vacuolation  Not Reportable   01/11/17  12:44    


 


Dohle Bodies  Not Reportable   01/11/17  12:44    


 


Pelger-Huet Anomaly  Not Reportable   01/11/17  12:44    


 


Imer Rods  Not Reportable   01/11/17  12:44    


 


Platelet Estimate  Appears normal   01/11/17  12:44    


 


Clumped Platelets  Not Reportable   01/11/17  12:44    


 


Plt Clumps, EDTA  Not Reportable   01/11/17  12:44    


 


Large Platelets  Not Reportable   01/11/17  12:44    


 


Giant Platelets  Not Reportable   01/11/17  12:44    


 


Platelet Satelliting  Not Reportable   01/11/17  12:44    


 


Plt Morphology Comment  Not Reportable   01/11/17  12:44    


 


RBC Morphology  Not Reportable   01/11/17  12:44    


 


Dimorphic RBCs  Not Reportable   01/11/17  12:44    


 


Polychromasia  Not Reportable   01/11/17  12:44    


 


Hypochromasia  Not Reportable   01/11/17  12:44    


 


Poikilocytosis  Not Reportable   01/11/17  12:44    


 


Anisocytosis  1+   01/11/17  12:44    


 


Microcytosis  Not Reportable   01/11/17  12:44    


 


Macrocytosis  Not Reportable   01/11/17  12:44    


 


Spherocytes  Not Reportable   01/11/17  12:44    


 


Pappenheimer Bodies  Not Reportable   01/11/17  12:44    


 


Sickle Cells  Not Reportable   01/11/17  12:44    


 


Target Cells  Not Reportable   01/11/17  12:44    


 


Tear Drop Cells  Not Reportable   01/11/17  12:44    


 


Ovalocytes  Not Reportable   01/11/17  12:44    


 


Helmet Cells  Not Reportable   01/11/17  12:44    


 


Guerrero-[place] Bodies  Not Reportable   01/11/17  12:44    


 


Cabot Rings  Not Reportable   01/11/17  12:44    


 


[place] Cells  Not Reportable   01/11/17  12:44    


 


Bite Cells  Not Reportable   01/11/17  12:44    


 


Crenated Cell  Not Reportable   01/11/17  12:44    


 


Elliptocytes  Not Reportable   01/11/17  12:44    


 


Acanthocytes (Spur)  Not Reportable   01/11/17  12:44    


 


Rouleaux  Not Reportable   01/11/17  12:44    


 


Hemoglobin C Crystals  Not Reportable   01/11/17  12:44    


 


Schistocytes  Not Reportable   01/11/17  12:44    


 


Malaria parasites  Not Reportable   01/11/17  12:44    


 


Tutu Bodies  Not Reportable   01/11/17  12:44    


 


Hem Pathologist Commnt  No   01/11/17  12:44    


 


Sodium  141 mmol/L (137-145)   01/12/17  13:48    


 


Potassium  4.7 mmol/L (3.6-5.0)   01/12/17  13:48    


 


Chloride  105.1 mmol/L ()   01/12/17  13:48    


 


Carbon Dioxide  21 mmol/L (22-30)  L  01/12/17  13:48    


 


Anion Gap  20 mmol/L  01/12/17  13:48    


 


BUN  11 mg/dL (7-17)   01/12/17  13:48    


 


Creatinine  0.6 mg/dL (0.7-1.2)  L  01/12/17  13:48    


 


Estimated GFR  > 60 ml/min  01/12/17  13:48    


 


BUN/Creatinine Ratio  18.33 %  01/12/17  13:48    


 


Glucose  129 mg/dL ()  H  01/12/17  13:48    


 


Calcium  8.8 mg/dL (8.4-10.2)   01/12/17  13:48    


 


Total Bilirubin  0.3 mg/dL (0.1-1.2)   01/08/17  16:21    


 


Direct Bilirubin  < 0.2 mg/dL (0-0.2)   01/07/17  16:25    


 


Indirect Bilirubin  0.0 mg/dL  01/07/17  16:25    


 


AST  15 units/L (5-40)   01/08/17  16:21    


 


ALT  12 units/L (7-56)   01/08/17  16:21    


 


Alkaline Phosphatase  70 units/L ()   01/08/17  16:21    


 


Total Creatine Kinase  50 units/L ()   01/08/17  16:21    


 


Total Protein  6.8 g/dL (6.3-8.2)   01/08/17  16:21    


 


Albumin  3.4 g/dL (3.9-5)  L  01/08/17  16:21    


 


Albumin/Globulin Ratio  1.0 %  01/08/17  16:21    


 


Urine Color  Rabia  (Yellow)   01/07/17  15:52    


 


Urine Turbidity  Slightly-cloudy  (Clear)   01/07/17  15:52    


 


Urine pH  5.0  (5.0-7.0)   01/07/17  15:52    


 


Ur Specific Gravity  1.024  (1.003-1.030)   01/07/17  15:52    


 


Urine Protein  30 mg/dl mg/dL (Negative)   01/07/17  15:52    


 


Urine Glucose (UA)  Neg mg/dL (Negative)   01/07/17  15:52    


 


Urine Ketones  Tr mg/dL (Negative)   01/07/17  15:52    


 


Urine Blood  Neg  (Negative)   01/07/17  15:52    


 


Urine Nitrite  Pos  (Negative)   01/07/17  15:52    


 


Urine Bilirubin  Neg  (Negative)   01/07/17  15:52    


 


Urine Urobilinogen  2.0 mg/dL (<2.0)   01/07/17  15:52    


 


Ur Leukocyte Esterase  Tr  (Negative)   01/07/17  15:52    


 


Urine WBC (Auto)  22.0 /HPF (0.0-6.0)  H  01/07/17  15:52    


 


Urine RBC (Auto)  3.0 /HPF (0.0-6.0)   01/07/17  15:52    


 


U Epithel Cells (Auto)  13.0 /HPF (0-13.0)   01/07/17  15:52    


 


Urine Bacteria (Auto)  1+ /HPF (Negative)   01/07/17  15:52    


 


Urine Mucus  3+ /HPF  01/07/17  15:52    


 


Urine HCG, Qual  Negative  (Negative)   01/07/17  15:52    


 


Salicylates  < 0.3 mg/dL (2.8-20.0)  L  01/07/17  16:25    


 


Urine Opiates Screen  Presumptive negative   01/07/17  15:52    


 


Urine Methadone Screen  Presumptive negative   01/07/17  15:52    


 


Acetaminophen  < 15.0 ug/mL (10.0-30.0)   01/07/17  16:25    


 


Ur Barbiturates Screen  Presumptive negative   01/07/17  15:52    


 


Ur Phencyclidine Scrn  Presumptive negative   01/07/17  15:52    


 


Ur Amphetamines Screen  Presumptive positive   01/07/17  15:52    


 


U Benzodiazepines Scrn  Presumptive negative   01/07/17  15:52    


 


Lithium  0.1 mmol/L (0.0-1.2)   01/10/17  05:50    


 


Urine Cocaine Screen  Presumptive negative   01/07/17  15:52    


 


U Marijuana (THC) Screen  Presumptive negative   01/07/17  15:52    


 


Drugs of Abuse Note  Disclamer   01/07/17  15:52    


 


Plasma/Serum Alcohol  < 0.01 gm% (0-0.07)   01/07/17  16:25

## 2017-01-14 RX ADMIN — FAMOTIDINE SCH MG: 20 TABLET ORAL at 12:12

## 2017-01-14 RX ADMIN — ENOXAPARIN SODIUM SCH MG: 100 INJECTION SUBCUTANEOUS at 12:11

## 2017-01-14 RX ADMIN — CEFTRIAXONE SODIUM SCH MLS/HR: 1 INJECTION, POWDER, FOR SOLUTION INTRAMUSCULAR; INTRAVENOUS at 12:12

## 2017-01-14 NOTE — PROGRESS NOTE
Assessment and Plan


Assessment and plan: 





1. Acute psychosis with h/o schizophrenia.  Continue 1013 for safety.   Mental 

health  reassessed pt; she presents manic, racing thoughts, disorganized

, restless, manipulative. Pt has been referred to [facility].  





2.  Escherichia coli UTI.  Follow-up blood cultures.  Blood cultures are 

negative thus far.  Pt. is medically stable for discharge.  Cont. Rocephin.  

Patient will be changed to by mouth medication at discharge.





3.  Lithium toxicity.  Continue to monitor her and hold lithium.





4.  DVT prophylaxis- lovenox





History


Interval history: 


No new issues overnight.








Hospitalist Physical





- Constitutional


Vitals: 


 











Temp Pulse Resp BP Pulse Ox


 


 97.3 F L  63   16   103/53   98 


 


 01/14/17 07:56  01/14/17 07:56  01/14/17 07:56  01/14/17 07:56  01/14/17 07:56











General appearance: Present: no acute distress, well-nourished





- EENT


Eyes: Present: PERRL, EOM intact


ENT: hearing intact, clear oral mucosa, dentition normal





- Neck


Neck: Present: supple, normal ROM





- Respiratory


Respiratory effort: normal


Respiratory: bilateral: CTA





- Cardiovascular


Rhythm: regular


Heart Sounds: Present: S1 & S2.  Absent: gallop, rub





- Extremities


Extremities: no ischemia, No edema, Full ROM





- Abdominal


General gastrointestinal: soft, non-tender, non-distended, normal bowel sounds





- Integumentary


Integumentary: Present: clear, warm, dry





- Neurologic


Neurologic: CNII-XII intact, moves all extremities





Results





- Labs


CBC & Chem 7: 


 01/12/17 13:48





 01/12/17 13:48


Labs: 


 Laboratory Last Values











WBC  9.7 K/mm3 (4.5-11.0)   01/12/17  13:48    


 


RBC  4.78 M/mm3 (3.65-5.03)   01/12/17  13:48    


 


Hgb  13.9 gm/dl (10.1-14.3)   01/12/17  13:48    


 


Hct  42.0 % (30.3-42.9)   01/12/17  13:48    


 


MCV  88 fl (79-97)   01/12/17  13:48    


 


MCH  29 pg (28-32)   01/12/17  13:48    


 


MCHC  33 % (30-34)   01/12/17  13:48    


 


RDW  14.9 % (13.2-15.2)   01/12/17  13:48    


 


Plt Count  331 K/mm3 (140-440)   01/12/17  13:48    


 


Lymph % (Auto)  35.2 % (13.4-35.0)  H  01/12/17  13:48    


 


Mono % (Auto)  5.6 % (0.0-7.3)   01/12/17  13:48    


 


Eos % (Auto)  1.6 % (0.0-4.3)   01/12/17  13:48    


 


Baso % (Auto)  0.4 % (0.0-1.8)   01/12/17  13:48    


 


Lymph #  3.4 K/mm3 (1.2-5.4)   01/12/17  13:48    


 


Mono #  0.5 K/mm3 (0.0-0.8)   01/12/17  13:48    


 


Eos #  0.2 K/mm3 (0.0-0.4)   01/12/17  13:48    


 


Baso #  0.0 K/mm3 (0.0-0.1)   01/12/17  13:48    


 


Add Manual Diff  Complete   01/11/17  12:44    


 


Total Counted  100   01/11/17  12:44    


 


Seg Neutrophils %  57.2 % (40.0-70.0)   01/12/17  13:48    


 


Seg Neuts % (Manual)  56.0 % (40.0-70.0)   01/11/17  12:44    


 


Band Neutrophils %  0 %  01/11/17  12:44    


 


Lymphocytes % (Manual)  39.0 % (13.4-35.0)  H  01/11/17  12:44    


 


Reactive Lymphs % (Man)  0 %  01/11/17  12:44    


 


Monocytes % (Manual)  5.0 % (0.0-7.3)   01/11/17  12:44    


 


Eosinophils % (Manual)  0 % (0.0-4.3)   01/11/17  12:44    


 


Basophils % (Manual)  0 % (0.0-1.8)   01/11/17  12:44    


 


Metamyelocytes %  0 %  01/11/17  12:44    


 


Myelocytes %  0 %  01/11/17  12:44    


 


Promyelocytes %  0 %  01/11/17  12:44    


 


Blast Cells %  0 %  01/11/17  12:44    


 


Nucleated RBC %  Not Reportable   01/11/17  12:44    


 


Seg Neutrophils #  5.6 K/mm3 (1.8-7.7)   01/12/17  13:48    


 


Seg Neutrophils # Man  6.8 K/mm3 (1.8-7.7)   01/11/17  12:44    


 


Band Neutrophils #  0.0 K/mm3  01/11/17  12:44    


 


Lymphocytes # (Manual)  4.8 K/mm3 (1.2-5.4)   01/11/17  12:44    


 


Abs React Lymphs (Man)  0.0 K/mm3  01/11/17  12:44    


 


Monocytes # (Manual)  0.6 K/mm3 (0.0-0.8)   01/11/17  12:44    


 


Eosinophils # (Manual)  0.0 K/mm3 (0.0-0.4)   01/11/17  12:44    


 


Basophils # (Manual)  0.0 K/mm3 (0.0-0.1)   01/11/17  12:44    


 


Metamyelocytes #  0.0 K/mm3  01/11/17  12:44    


 


Myelocytes #  0.0 K/mm3  01/11/17  12:44    


 


Promyelocytes #  0.0 K/mm3  01/11/17  12:44    


 


Blast Cells #  0.0 K/mm3  01/11/17  12:44    


 


WBC Morphology  Not Reportable   01/11/17  12:44    


 


Hypersegmented Neuts  Not Reportable   01/11/17  12:44    


 


Hyposegmented Neuts  Not Reportable   01/11/17  12:44    


 


Hypogranular Neuts  Not Reportable   01/11/17  12:44    


 


Smudge Cells  Not Reportable   01/11/17  12:44    


 


Toxic Granulation  Not Reportable   01/11/17  12:44    


 


Toxic Vacuolation  Not Reportable   01/11/17  12:44    


 


Dohle Bodies  Not Reportable   01/11/17  12:44    


 


Pelger-Huet Anomaly  Not Reportable   01/11/17  12:44    


 


Imer Rods  Not Reportable   01/11/17  12:44    


 


Platelet Estimate  Appears normal   01/11/17  12:44    


 


Clumped Platelets  Not Reportable   01/11/17  12:44    


 


Plt Clumps, EDTA  Not Reportable   01/11/17  12:44    


 


Large Platelets  Not Reportable   01/11/17  12:44    


 


Giant Platelets  Not Reportable   01/11/17  12:44    


 


Platelet Satelliting  Not Reportable   01/11/17  12:44    


 


Plt Morphology Comment  Not Reportable   01/11/17  12:44    


 


RBC Morphology  Not Reportable   01/11/17  12:44    


 


Dimorphic RBCs  Not Reportable   01/11/17  12:44    


 


Polychromasia  Not Reportable   01/11/17  12:44    


 


Hypochromasia  Not Reportable   01/11/17  12:44    


 


Poikilocytosis  Not Reportable   01/11/17  12:44    


 


Anisocytosis  1+   01/11/17  12:44    


 


Microcytosis  Not Reportable   01/11/17  12:44    


 


Macrocytosis  Not Reportable   01/11/17  12:44    


 


Spherocytes  Not Reportable   01/11/17  12:44    


 


Pappenheimer Bodies  Not Reportable   01/11/17  12:44    


 


Sickle Cells  Not Reportable   01/11/17  12:44    


 


Target Cells  Not Reportable   01/11/17  12:44    


 


Tear Drop Cells  Not Reportable   01/11/17  12:44    


 


Ovalocytes  Not Reportable   01/11/17  12:44    


 


Helmet Cells  Not Reportable   01/11/17  12:44    


 


Guerrero-[place] Bodies  Not Reportable   01/11/17  12:44    


 


Cabot Rings  Not Reportable   01/11/17  12:44    


 


[place] Cells  Not Reportable   01/11/17  12:44    


 


Bite Cells  Not Reportable   01/11/17  12:44    


 


Crenated Cell  Not Reportable   01/11/17  12:44    


 


Elliptocytes  Not Reportable   01/11/17  12:44    


 


Acanthocytes (Spur)  Not Reportable   01/11/17  12:44    


 


Rouleaux  Not Reportable   01/11/17  12:44    


 


Hemoglobin C Crystals  Not Reportable   01/11/17  12:44    


 


Schistocytes  Not Reportable   01/11/17  12:44    


 


Malaria parasites  Not Reportable   01/11/17  12:44    


 


Tutu Bodies  Not Reportable   01/11/17  12:44    


 


Hem Pathologist Commnt  No   01/11/17  12:44    


 


Sodium  141 mmol/L (137-145)   01/12/17  13:48    


 


Potassium  4.7 mmol/L (3.6-5.0)   01/12/17  13:48    


 


Chloride  105.1 mmol/L ()   01/12/17  13:48    


 


Carbon Dioxide  21 mmol/L (22-30)  L  01/12/17  13:48    


 


Anion Gap  20 mmol/L  01/12/17  13:48    


 


BUN  11 mg/dL (7-17)   01/12/17  13:48    


 


Creatinine  0.6 mg/dL (0.7-1.2)  L  01/12/17  13:48    


 


Estimated GFR  > 60 ml/min  01/12/17  13:48    


 


BUN/Creatinine Ratio  18.33 %  01/12/17  13:48    


 


Glucose  129 mg/dL ()  H  01/12/17  13:48    


 


Calcium  8.8 mg/dL (8.4-10.2)   01/12/17  13:48    


 


Total Bilirubin  0.3 mg/dL (0.1-1.2)   01/08/17  16:21    


 


Direct Bilirubin  < 0.2 mg/dL (0-0.2)   01/07/17  16:25    


 


Indirect Bilirubin  0.0 mg/dL  01/07/17  16:25    


 


AST  15 units/L (5-40)   01/08/17  16:21    


 


ALT  12 units/L (7-56)   01/08/17  16:21    


 


Alkaline Phosphatase  70 units/L ()   01/08/17  16:21    


 


Total Creatine Kinase  50 units/L ()   01/08/17  16:21    


 


Total Protein  6.8 g/dL (6.3-8.2)   01/08/17  16:21    


 


Albumin  3.4 g/dL (3.9-5)  L  01/08/17  16:21    


 


Albumin/Globulin Ratio  1.0 %  01/08/17  16:21    


 


Urine Color  Rabia  (Yellow)   01/07/17  15:52    


 


Urine Turbidity  Slightly-cloudy  (Clear)   01/07/17  15:52    


 


Urine pH  5.0  (5.0-7.0)   01/07/17  15:52    


 


Ur Specific Gravity  1.024  (1.003-1.030)   01/07/17  15:52    


 


Urine Protein  30 mg/dl mg/dL (Negative)   01/07/17  15:52    


 


Urine Glucose (UA)  Neg mg/dL (Negative)   01/07/17  15:52    


 


Urine Ketones  Tr mg/dL (Negative)   01/07/17  15:52    


 


Urine Blood  Neg  (Negative)   01/07/17  15:52    


 


Urine Nitrite  Pos  (Negative)   01/07/17  15:52    


 


Urine Bilirubin  Neg  (Negative)   01/07/17  15:52    


 


Urine Urobilinogen  2.0 mg/dL (<2.0)   01/07/17  15:52    


 


Ur Leukocyte Esterase  Tr  (Negative)   01/07/17  15:52    


 


Urine WBC (Auto)  22.0 /HPF (0.0-6.0)  H  01/07/17  15:52    


 


Urine RBC (Auto)  3.0 /HPF (0.0-6.0)   01/07/17  15:52    


 


U Epithel Cells (Auto)  13.0 /HPF (0-13.0)   01/07/17  15:52    


 


Urine Bacteria (Auto)  1+ /HPF (Negative)   01/07/17  15:52    


 


Urine Mucus  3+ /HPF  01/07/17  15:52    


 


Urine HCG, Qual  Negative  (Negative)   01/07/17  15:52    


 


Salicylates  < 0.3 mg/dL (2.8-20.0)  L  01/07/17  16:25    


 


Urine Opiates Screen  Presumptive negative   01/07/17  15:52    


 


Urine Methadone Screen  Presumptive negative   01/07/17  15:52    


 


Acetaminophen  < 15.0 ug/mL (10.0-30.0)   01/07/17  16:25    


 


Ur Barbiturates Screen  Presumptive negative   01/07/17  15:52    


 


Ur Phencyclidine Scrn  Presumptive negative   01/07/17  15:52    


 


Ur Amphetamines Screen  Presumptive positive   01/07/17  15:52    


 


U Benzodiazepines Scrn  Presumptive negative   01/07/17  15:52    


 


Lithium  0.1 mmol/L (0.0-1.2)   01/10/17  05:50    


 


Urine Cocaine Screen  Presumptive negative   01/07/17  15:52    


 


U Marijuana (THC) Screen  Presumptive negative   01/07/17  15:52    


 


Drugs of Abuse Note  Disclamer   01/07/17  15:52    


 


Plasma/Serum Alcohol  < 0.01 gm% (0-0.07)   01/07/17  16:25

## 2017-01-15 RX ADMIN — CEFTRIAXONE SODIUM SCH MLS/HR: 1 INJECTION, POWDER, FOR SOLUTION INTRAMUSCULAR; INTRAVENOUS at 11:00

## 2017-01-15 RX ADMIN — ZOLPIDEM TARTRATE PRN MG: 5 TABLET ORAL at 22:17

## 2017-01-15 RX ADMIN — ENOXAPARIN SODIUM SCH: 100 INJECTION SUBCUTANEOUS at 11:00

## 2017-01-15 RX ADMIN — FAMOTIDINE SCH MG: 20 TABLET ORAL at 22:17

## 2017-01-15 RX ADMIN — ZOLPIDEM TARTRATE PRN MG: 5 TABLET ORAL at 00:20

## 2017-01-15 RX ADMIN — FAMOTIDINE SCH MG: 20 TABLET ORAL at 11:00

## 2017-01-15 RX ADMIN — FAMOTIDINE SCH MG: 20 TABLET ORAL at 00:20

## 2017-01-15 NOTE — PROGRESS NOTE
Assessment and Plan


Assessment and plan: 





1. Acute psychosis with h/o schizophrenia.  Continue 1013 for safety.   Mental 

health  reassessed pt; she presents manic, racing thoughts with 

disorganized, restless, manipulative behavior.  Pt has been referred to 

[facility].  





2.  Escherichia coli UTI.  Follow-up blood cultures.  Blood cultures are 

negative thus far.  Pt. is medically stable for discharge.  Cont. Rocephin.  

Patient will be changed to by mouth medication at discharge.





3.  Lithium toxicity.  Continue to monitor her and hold lithium.





4.  DVT prophylaxis- lovenox





5.  Constipation.  MiraLAX.





History


Interval history: 


No new issues overnight.  Patient complained of constipation.








Hospitalist Physical





- Constitutional


Vitals: 


 











Temp Pulse Resp BP Pulse Ox


 


 98.9 F   85   20   92/64   98 


 


 01/14/17 21:00  01/14/17 21:00  01/14/17 21:00  01/14/17 21:00  01/14/17 21:00











General appearance: Present: no acute distress, well-nourished





- EENT


Eyes: Present: PERRL, EOM intact


ENT: hearing intact, clear oral mucosa, dentition normal





- Neck


Neck: Present: supple, normal ROM





- Respiratory


Respiratory effort: normal


Respiratory: bilateral: CTA





- Cardiovascular


Rhythm: regular


Heart Sounds: Present: S1 & S2.  Absent: gallop, rub





- Extremities


Extremities: no ischemia, No edema, Full ROM





- Abdominal


General gastrointestinal: soft, non-tender, non-distended, normal bowel sounds





- Integumentary


Integumentary: Present: clear, warm, dry





- Neurologic


Neurologic: CNII-XII intact, moves all extremities





Results





- Labs


CBC & Chem 7: 


 01/12/17 13:48





 01/12/17 13:48


Labs: 


 Laboratory Last Values











WBC  9.7 K/mm3 (4.5-11.0)   01/12/17  13:48    


 


RBC  4.78 M/mm3 (3.65-5.03)   01/12/17  13:48    


 


Hgb  13.9 gm/dl (10.1-14.3)   01/12/17  13:48    


 


Hct  42.0 % (30.3-42.9)   01/12/17  13:48    


 


MCV  88 fl (79-97)   01/12/17  13:48    


 


MCH  29 pg (28-32)   01/12/17  13:48    


 


MCHC  33 % (30-34)   01/12/17  13:48    


 


RDW  14.9 % (13.2-15.2)   01/12/17  13:48    


 


Plt Count  331 K/mm3 (140-440)   01/12/17  13:48    


 


Lymph % (Auto)  35.2 % (13.4-35.0)  H  01/12/17  13:48    


 


Mono % (Auto)  5.6 % (0.0-7.3)   01/12/17  13:48    


 


Eos % (Auto)  1.6 % (0.0-4.3)   01/12/17  13:48    


 


Baso % (Auto)  0.4 % (0.0-1.8)   01/12/17  13:48    


 


Lymph #  3.4 K/mm3 (1.2-5.4)   01/12/17  13:48    


 


Mono #  0.5 K/mm3 (0.0-0.8)   01/12/17  13:48    


 


Eos #  0.2 K/mm3 (0.0-0.4)   01/12/17  13:48    


 


Baso #  0.0 K/mm3 (0.0-0.1)   01/12/17  13:48    


 


Add Manual Diff  Complete   01/11/17  12:44    


 


Total Counted  100   01/11/17  12:44    


 


Seg Neutrophils %  57.2 % (40.0-70.0)   01/12/17  13:48    


 


Seg Neuts % (Manual)  56.0 % (40.0-70.0)   01/11/17  12:44    


 


Band Neutrophils %  0 %  01/11/17  12:44    


 


Lymphocytes % (Manual)  39.0 % (13.4-35.0)  H  01/11/17  12:44    


 


Reactive Lymphs % (Man)  0 %  01/11/17  12:44    


 


Monocytes % (Manual)  5.0 % (0.0-7.3)   01/11/17  12:44    


 


Eosinophils % (Manual)  0 % (0.0-4.3)   01/11/17  12:44    


 


Basophils % (Manual)  0 % (0.0-1.8)   01/11/17  12:44    


 


Metamyelocytes %  0 %  01/11/17  12:44    


 


Myelocytes %  0 %  01/11/17  12:44    


 


Promyelocytes %  0 %  01/11/17  12:44    


 


Blast Cells %  0 %  01/11/17  12:44    


 


Nucleated RBC %  Not Reportable   01/11/17  12:44    


 


Seg Neutrophils #  5.6 K/mm3 (1.8-7.7)   01/12/17  13:48    


 


Seg Neutrophils # Man  6.8 K/mm3 (1.8-7.7)   01/11/17  12:44    


 


Band Neutrophils #  0.0 K/mm3  01/11/17  12:44    


 


Lymphocytes # (Manual)  4.8 K/mm3 (1.2-5.4)   01/11/17  12:44    


 


Abs React Lymphs (Man)  0.0 K/mm3  01/11/17  12:44    


 


Monocytes # (Manual)  0.6 K/mm3 (0.0-0.8)   01/11/17  12:44    


 


Eosinophils # (Manual)  0.0 K/mm3 (0.0-0.4)   01/11/17  12:44    


 


Basophils # (Manual)  0.0 K/mm3 (0.0-0.1)   01/11/17  12:44    


 


Metamyelocytes #  0.0 K/mm3  01/11/17  12:44    


 


Myelocytes #  0.0 K/mm3  01/11/17  12:44    


 


Promyelocytes #  0.0 K/mm3  01/11/17  12:44    


 


Blast Cells #  0.0 K/mm3  01/11/17  12:44    


 


WBC Morphology  Not Reportable   01/11/17  12:44    


 


Hypersegmented Neuts  Not Reportable   01/11/17  12:44    


 


Hyposegmented Neuts  Not Reportable   01/11/17  12:44    


 


Hypogranular Neuts  Not Reportable   01/11/17  12:44    


 


Smudge Cells  Not Reportable   01/11/17  12:44    


 


Toxic Granulation  Not Reportable   01/11/17  12:44    


 


Toxic Vacuolation  Not Reportable   01/11/17  12:44    


 


Dohle Bodies  Not Reportable   01/11/17  12:44    


 


Pelger-Huet Anomaly  Not Reportable   01/11/17  12:44    


 


Imer Rods  Not Reportable   01/11/17  12:44    


 


Platelet Estimate  Appears normal   01/11/17  12:44    


 


Clumped Platelets  Not Reportable   01/11/17  12:44    


 


Plt Clumps, EDTA  Not Reportable   01/11/17  12:44    


 


Large Platelets  Not Reportable   01/11/17  12:44    


 


Giant Platelets  Not Reportable   01/11/17  12:44    


 


Platelet Satelliting  Not Reportable   01/11/17  12:44    


 


Plt Morphology Comment  Not Reportable   01/11/17  12:44    


 


RBC Morphology  Not Reportable   01/11/17  12:44    


 


Dimorphic RBCs  Not Reportable   01/11/17  12:44    


 


Polychromasia  Not Reportable   01/11/17  12:44    


 


Hypochromasia  Not Reportable   01/11/17  12:44    


 


Poikilocytosis  Not Reportable   01/11/17  12:44    


 


Anisocytosis  1+   01/11/17  12:44    


 


Microcytosis  Not Reportable   01/11/17  12:44    


 


Macrocytosis  Not Reportable   01/11/17  12:44    


 


Spherocytes  Not Reportable   01/11/17  12:44    


 


Pappenheimer Bodies  Not Reportable   01/11/17  12:44    


 


Sickle Cells  Not Reportable   01/11/17  12:44    


 


Target Cells  Not Reportable   01/11/17  12:44    


 


Tear Drop Cells  Not Reportable   01/11/17  12:44    


 


Ovalocytes  Not Reportable   01/11/17  12:44    


 


Helmet Cells  Not Reportable   01/11/17  12:44    


 


[place] Bodies  Not Reportable   01/11/17  12:44    


 


Cabot Rings  Not Reportable   01/11/17  12:44    


 


[place] Cells  Not Reportable   01/11/17  12:44    


 


Bite Cells  Not Reportable   01/11/17  12:44    


 


Crenated Cell  Not Reportable   01/11/17  12:44    


 


Elliptocytes  Not Reportable   01/11/17  12:44    


 


Acanthocytes (Spur)  Not Reportable   01/11/17  12:44    


 


Rouleaux  Not Reportable   01/11/17  12:44    


 


Hemoglobin C Crystals  Not Reportable   01/11/17  12:44    


 


Schistocytes  Not Reportable   01/11/17  12:44    


 


Malaria parasites  Not Reportable   01/11/17  12:44    


 


Tutu Bodies  Not Reportable   01/11/17  12:44    


 


Hem Pathologist Commnt  No   01/11/17  12:44    


 


Sodium  141 mmol/L (137-145)   01/12/17  13:48    


 


Potassium  4.7 mmol/L (3.6-5.0)   01/12/17  13:48    


 


Chloride  105.1 mmol/L ()   01/12/17  13:48    


 


Carbon Dioxide  21 mmol/L (22-30)  L  01/12/17  13:48    


 


Anion Gap  20 mmol/L  01/12/17  13:48    


 


BUN  11 mg/dL (7-17)   01/12/17  13:48    


 


Creatinine  0.6 mg/dL (0.7-1.2)  L  01/12/17  13:48    


 


Estimated GFR  > 60 ml/min  01/12/17  13:48    


 


BUN/Creatinine Ratio  18.33 %  01/12/17  13:48    


 


Glucose  129 mg/dL ()  H  01/12/17  13:48    


 


Calcium  8.8 mg/dL (8.4-10.2)   01/12/17  13:48    


 


Total Bilirubin  0.3 mg/dL (0.1-1.2)   01/08/17  16:21    


 


Direct Bilirubin  < 0.2 mg/dL (0-0.2)   01/07/17  16:25    


 


Indirect Bilirubin  0.0 mg/dL  01/07/17  16:25    


 


AST  15 units/L (5-40)   01/08/17  16:21    


 


ALT  12 units/L (7-56)   01/08/17  16:21    


 


Alkaline Phosphatase  70 units/L ()   01/08/17  16:21    


 


Total Creatine Kinase  50 units/L ()   01/08/17  16:21    


 


Total Protein  6.8 g/dL (6.3-8.2)   01/08/17  16:21    


 


Albumin  3.4 g/dL (3.9-5)  L  01/08/17  16:21    


 


Albumin/Globulin Ratio  1.0 %  01/08/17  16:21    


 


Urine Color  Rabia  (Yellow)   01/07/17  15:52    


 


Urine Turbidity  Slightly-cloudy  (Clear)   01/07/17  15:52    


 


Urine pH  5.0  (5.0-7.0)   01/07/17  15:52    


 


Ur Specific Gravity  1.024  (1.003-1.030)   01/07/17  15:52    


 


Urine Protein  30 mg/dl mg/dL (Negative)   01/07/17  15:52    


 


Urine Glucose (UA)  Neg mg/dL (Negative)   01/07/17  15:52    


 


Urine Ketones  Tr mg/dL (Negative)   01/07/17  15:52    


 


Urine Blood  Neg  (Negative)   01/07/17  15:52    


 


Urine Nitrite  Pos  (Negative)   01/07/17  15:52    


 


Urine Bilirubin  Neg  (Negative)   01/07/17  15:52    


 


Urine Urobilinogen  2.0 mg/dL (<2.0)   01/07/17  15:52    


 


Ur Leukocyte Esterase  Tr  (Negative)   01/07/17  15:52    


 


Urine WBC (Auto)  22.0 /HPF (0.0-6.0)  H  01/07/17  15:52    


 


Urine RBC (Auto)  3.0 /HPF (0.0-6.0)   01/07/17  15:52    


 


U Epithel Cells (Auto)  13.0 /HPF (0-13.0)   01/07/17  15:52    


 


Urine Bacteria (Auto)  1+ /HPF (Negative)   01/07/17  15:52    


 


Urine Mucus  3+ /HPF  01/07/17  15:52    


 


Urine HCG, Qual  Negative  (Negative)   01/07/17  15:52    


 


Salicylates  < 0.3 mg/dL (2.8-20.0)  L  01/07/17  16:25    


 


Urine Opiates Screen  Presumptive negative   01/07/17  15:52    


 


Urine Methadone Screen  Presumptive negative   01/07/17  15:52    


 


Acetaminophen  < 15.0 ug/mL (10.0-30.0)   01/07/17  16:25    


 


Ur Barbiturates Screen  Presumptive negative   01/07/17  15:52    


 


Ur Phencyclidine Scrn  Presumptive negative   01/07/17  15:52    


 


Ur Amphetamines Screen  Presumptive positive   01/07/17  15:52    


 


U Benzodiazepines Scrn  Presumptive negative   01/07/17  15:52    


 


Lithium  0.1 mmol/L (0.0-1.2)   01/10/17  05:50    


 


Urine Cocaine Screen  Presumptive negative   01/07/17  15:52    


 


U Marijuana (THC) Screen  Presumptive negative   01/07/17  15:52    


 


Drugs of Abuse Note  Disclamer   01/07/17  15:52    


 


Plasma/Serum Alcohol  < 0.01 gm% (0-0.07)   01/07/17  16:25

## 2017-01-16 RX ADMIN — ENOXAPARIN SODIUM SCH MG: 100 INJECTION SUBCUTANEOUS at 10:48

## 2017-01-16 RX ADMIN — CEFTRIAXONE SODIUM SCH: 1 INJECTION, POWDER, FOR SOLUTION INTRAMUSCULAR; INTRAVENOUS at 10:49

## 2017-01-16 RX ADMIN — FAMOTIDINE SCH MG: 20 TABLET ORAL at 10:48

## 2017-01-16 RX ADMIN — FAMOTIDINE SCH MG: 20 TABLET ORAL at 21:26

## 2017-01-16 NOTE — PROGRESS NOTE
Assessment and Plan


Assessment and plan: 


21-year-old female who presented through the emergency department with 

aggressive and manic behavior.  Patient has a history of schizophrenia and was 

found to have acute psychosis.  Clinically, patient was noted to have urinary 

tract infection and lithium toxicity.  Lithium was held in patient's level 

returned to normal.  Urinary tract infection was treated with Rocephin.  

Identification and Sensitivities are still pending on the organism.  Patient 

was evaluated by mental health  was found to be manic with racing 

thoughts.  Patient was disorganized, restless and manipulative. Pt has been 

referred to [facility].  





1. Acute psychosis with h/o schizophrenia.  Continue 1013 for safety.   Mental 

health  reassessed pt; she presents manic, racing thoughts, disorganized

, restless, manipulative. Pt has been referred to [facility] Schmitz.  





2.  Escherichia coli UTI.  Follow-up blood cultures.  Blood cultures are 

negative thus far.  Pt. is medically stable for discharge.  Cont. Rocephin.  We'

ll discontinue after today's dose.  Patient would've completed 8 days of 

antibiotics 





3.  Lithium toxicity.  Continue to monitor her and hold lithium.





4.  DVT prophylaxis- lovenox








History


Interval history: 


Patient seen and examined this morning in no acute distress, patient ambulating.


Denies any chest pain, nausea, vomiting, diarrhea


No fever noted blood pressure controlled


No adverse events reported to me by nursing staff








Hospitalist Physical





- Physical exam


Narrative exam: 


 VITAL SIGNS:  Reviewed.    


GENERAL:  The patient appeared well nourished and normally developed. Vital 

signs as documented.


HEAD:  No signs of head trauma.


EYES:  Pupils are equal.  Extraocular motions intact.  


EARS:  Hearing grossly intact.


MOUTH:  Oropharynx is normal. 


NECK:  No adenopathy, no JVD.   


CHEST:  Chest with clear breath sounds bilaterally.  No wheezes, rales, or 

rhonchi.  


CARDIAC:  Regular rate and rhythm.  S1 and S2, without murmurs, gallops, or 

rubs.


VASCULAR:  No Edema.  Peripheral pulses normal and equal in all extremities.


ABDOMEN:  Soft, without detectable tenderness.  No sign of distention.  No   

rebound or guarding, and no masses palpated.   Bowel Sounds normal.


MUSCULOSKELETAL:  Good range of motion of all major joints. Extremities without 

clubbing, cyanosis or edema.  


NEUROLOGIC EXAM:  Alert and oriented x 3.  No focal sensory or strength 

deficits.   Speech normal.  Follows commands.


PSYCHIATRIC:  Mood normal.


SKIN:  No rash or lesions.














- Constitutional


Vitals: 


 











Temp Pulse Resp BP Pulse Ox


 


 98 F   70   16   104/60   98 


 


 01/16/17 08:00  01/16/17 08:00  01/16/17 08:00  01/16/17 08:00  01/16/17 00:00











General appearance: Present: no acute distress, well-nourished





Results





- Labs


CBC & Chem 7: 


 01/12/17 13:48





 01/12/17 13:48


Labs: 


 Laboratory Last Values











WBC  9.7 K/mm3 (4.5-11.0)   01/12/17  13:48    


 


RBC  4.78 M/mm3 (3.65-5.03)   01/12/17  13:48    


 


Hgb  13.9 gm/dl (10.1-14.3)   01/12/17  13:48    


 


Hct  42.0 % (30.3-42.9)   01/12/17  13:48    


 


MCV  88 fl (79-97)   01/12/17  13:48    


 


MCH  29 pg (28-32)   01/12/17  13:48    


 


MCHC  33 % (30-34)   01/12/17  13:48    


 


RDW  14.9 % (13.2-15.2)   01/12/17  13:48    


 


Plt Count  331 K/mm3 (140-440)   01/12/17  13:48    


 


Lymph % (Auto)  35.2 % (13.4-35.0)  H  01/12/17  13:48    


 


Mono % (Auto)  5.6 % (0.0-7.3)   01/12/17  13:48    


 


Eos % (Auto)  1.6 % (0.0-4.3)   01/12/17  13:48    


 


Baso % (Auto)  0.4 % (0.0-1.8)   01/12/17  13:48    


 


Lymph #  3.4 K/mm3 (1.2-5.4)   01/12/17  13:48    


 


Mono #  0.5 K/mm3 (0.0-0.8)   01/12/17  13:48    


 


Eos #  0.2 K/mm3 (0.0-0.4)   01/12/17  13:48    


 


Baso #  0.0 K/mm3 (0.0-0.1)   01/12/17  13:48    


 


Add Manual Diff  Complete   01/11/17  12:44    


 


Total Counted  100   01/11/17  12:44    


 


Seg Neutrophils %  57.2 % (40.0-70.0)   01/12/17  13:48    


 


Seg Neuts % (Manual)  56.0 % (40.0-70.0)   01/11/17  12:44    


 


Band Neutrophils %  0 %  01/11/17  12:44    


 


Lymphocytes % (Manual)  39.0 % (13.4-35.0)  H  01/11/17  12:44    


 


Reactive Lymphs % (Man)  0 %  01/11/17  12:44    


 


Monocytes % (Manual)  5.0 % (0.0-7.3)   01/11/17  12:44    


 


Eosinophils % (Manual)  0 % (0.0-4.3)   01/11/17  12:44    


 


Basophils % (Manual)  0 % (0.0-1.8)   01/11/17  12:44    


 


Metamyelocytes %  0 %  01/11/17  12:44    


 


Myelocytes %  0 %  01/11/17  12:44    


 


Promyelocytes %  0 %  01/11/17  12:44    


 


Blast Cells %  0 %  01/11/17  12:44    


 


Nucleated RBC %  Not Reportable   01/11/17  12:44    


 


Seg Neutrophils #  5.6 K/mm3 (1.8-7.7)   01/12/17  13:48    


 


Seg Neutrophils # Man  6.8 K/mm3 (1.8-7.7)   01/11/17  12:44    


 


Band Neutrophils #  0.0 K/mm3  01/11/17  12:44    


 


Lymphocytes # (Manual)  4.8 K/mm3 (1.2-5.4)   01/11/17  12:44    


 


Abs React Lymphs (Man)  0.0 K/mm3  01/11/17  12:44    


 


Monocytes # (Manual)  0.6 K/mm3 (0.0-0.8)   01/11/17  12:44    


 


Eosinophils # (Manual)  0.0 K/mm3 (0.0-0.4)   01/11/17  12:44    


 


Basophils # (Manual)  0.0 K/mm3 (0.0-0.1)   01/11/17  12:44    


 


Metamyelocytes #  0.0 K/mm3  01/11/17  12:44    


 


Myelocytes #  0.0 K/mm3  01/11/17  12:44    


 


Promyelocytes #  0.0 K/mm3  01/11/17  12:44    


 


Blast Cells #  0.0 K/mm3  01/11/17  12:44    


 


WBC Morphology  Not Reportable   01/11/17  12:44    


 


Hypersegmented Neuts  Not Reportable   01/11/17  12:44    


 


Hyposegmented Neuts  Not Reportable   01/11/17  12:44    


 


Hypogranular Neuts  Not Reportable   01/11/17  12:44    


 


Smudge Cells  Not Reportable   01/11/17  12:44    


 


Toxic Granulation  Not Reportable   01/11/17  12:44    


 


Toxic Vacuolation  Not Reportable   01/11/17  12:44    


 


Dohle Bodies  Not Reportable   01/11/17  12:44    


 


Pelger-Huet Anomaly  Not Reportable   01/11/17  12:44    


 


Imer Rods  Not Reportable   01/11/17  12:44    


 


Platelet Estimate  Appears normal   01/11/17  12:44    


 


Clumped Platelets  Not Reportable   01/11/17  12:44    


 


Plt Clumps, EDTA  Not Reportable   01/11/17  12:44    


 


Large Platelets  Not Reportable   01/11/17  12:44    


 


Giant Platelets  Not Reportable   01/11/17  12:44    


 


Platelet Satelliting  Not Reportable   01/11/17  12:44    


 


Plt Morphology Comment  Not Reportable   01/11/17  12:44    


 


RBC Morphology  Not Reportable   01/11/17  12:44    


 


Dimorphic RBCs  Not Reportable   01/11/17  12:44    


 


Polychromasia  Not Reportable   01/11/17  12:44    


 


Hypochromasia  Not Reportable   01/11/17  12:44    


 


Poikilocytosis  Not Reportable   01/11/17  12:44    


 


Anisocytosis  1+   01/11/17  12:44    


 


Microcytosis  Not Reportable   01/11/17  12:44    


 


Macrocytosis  Not Reportable   01/11/17  12:44    


 


Spherocytes  Not Reportable   01/11/17  12:44    


 


Pappenheimer Bodies  Not Reportable   01/11/17  12:44    


 


Sickle Cells  Not Reportable   01/11/17  12:44    


 


Target Cells  Not Reportable   01/11/17  12:44    


 


Tear Drop Cells  Not Reportable   01/11/17  12:44    


 


Ovalocytes  Not Reportable   01/11/17  12:44    


 


Helmet Cells  Not Reportable   01/11/17  12:44    


 


[place] Bodies  Not Reportable   01/11/17  12:44    


 


Cabot Rings  Not Reportable   01/11/17  12:44    


 


[place] Cells  Not Reportable   01/11/17  12:44    


 


Bite Cells  Not Reportable   01/11/17  12:44    


 


Crenated Cell  Not Reportable   01/11/17  12:44    


 


Elliptocytes  Not Reportable   01/11/17  12:44    


 


Acanthocytes (Spur)  Not Reportable   01/11/17  12:44    


 


Rouleaux  Not Reportable   01/11/17  12:44    


 


Hemoglobin C Crystals  Not Reportable   01/11/17  12:44    


 


Schistocytes  Not Reportable   01/11/17  12:44    


 


Malaria parasites  Not Reportable   01/11/17  12:44    


 


T[place]u Bodies  Not Reportable   01/11/17  12:44    


 


Hem Pathologist Commnt  No   01/11/17  12:44    


 


Sodium  141 mmol/L (137-145)   01/12/17  13:48    


 


Potassium  4.7 mmol/L (3.6-5.0)   01/12/17  13:48    


 


Chloride  105.1 mmol/L ()   01/12/17  13:48    


 


Carbon Dioxide  21 mmol/L (22-30)  L  01/12/17  13:48    


 


Anion Gap  20 mmol/L  01/12/17  13:48    


 


BUN  11 mg/dL (7-17)   01/12/17  13:48    


 


Creatinine  0.6 mg/dL (0.7-1.2)  L  01/12/17  13:48    


 


Estimated GFR  > 60 ml/min  01/12/17  13:48    


 


BUN/Creatinine Ratio  18.33 %  01/12/17  13:48    


 


Glucose  129 mg/dL ()  H  01/12/17  13:48    


 


Calcium  8.8 mg/dL (8.4-10.2)   01/12/17  13:48    


 


Total Bilirubin  0.3 mg/dL (0.1-1.2)   01/08/17  16:21    


 


Direct Bilirubin  < 0.2 mg/dL (0-0.2)   01/07/17  16:25    


 


Indirect Bilirubin  0.0 mg/dL  01/07/17  16:25    


 


AST  15 units/L (5-40)   01/08/17  16:21    


 


ALT  12 units/L (7-56)   01/08/17  16:21    


 


Alkaline Phosphatase  70 units/L ()   01/08/17  16:21    


 


Total Creatine Kinase  50 units/L ()   01/08/17  16:21    


 


Total Protein  6.8 g/dL (6.3-8.2)   01/08/17  16:21    


 


Albumin  3.4 g/dL (3.9-5)  L  01/08/17  16:21    


 


Albumin/Globulin Ratio  1.0 %  01/08/17  16:21    


 


Urine Color  Rabia  (Yellow)   01/07/17  15:52    


 


Urine Turbidity  Slightly-cloudy  (Clear)   01/07/17  15:52    


 


Urine pH  5.0  (5.0-7.0)   01/07/17  15:52    


 


Ur Specific Gravity  1.024  (1.003-1.030)   01/07/17  15:52    


 


Urine Protein  30 mg/dl mg/dL (Negative)   01/07/17  15:52    


 


Urine Glucose (UA)  Neg mg/dL (Negative)   01/07/17  15:52    


 


Urine Ketones  Tr mg/dL (Negative)   01/07/17  15:52    


 


Urine Blood  Neg  (Negative)   01/07/17  15:52    


 


Urine Nitrite  Pos  (Negative)   01/07/17  15:52    


 


Urine Bilirubin  Neg  (Negative)   01/07/17  15:52    


 


Urine Urobilinogen  2.0 mg/dL (<2.0)   01/07/17  15:52    


 


Ur Leukocyte Esterase  Tr  (Negative)   01/07/17  15:52    


 


Urine WBC (Auto)  22.0 /HPF (0.0-6.0)  H  01/07/17  15:52    


 


Urine RBC (Auto)  3.0 /HPF (0.0-6.0)   01/07/17  15:52    


 


U Epithel Cells (Auto)  13.0 /HPF (0-13.0)   01/07/17  15:52    


 


Urine Bacteria (Auto)  1+ /HPF (Negative)   01/07/17  15:52    


 


Urine Mucus  3+ /HPF  01/07/17  15:52    


 


Urine HCG, Qual  Negative  (Negative)   01/07/17  15:52    


 


Salicylates  < 0.3 mg/dL (2.8-20.0)  L  01/07/17  16:25    


 


Urine Opiates Screen  Presumptive negative   01/07/17  15:52    


 


Urine Methadone Screen  Presumptive negative   01/07/17  15:52    


 


Acetaminophen  < 15.0 ug/mL (10.0-30.0)   01/07/17  16:25    


 


Ur Barbiturates Screen  Presumptive negative   01/07/17  15:52    


 


Ur Phencyclidine Scrn  Presumptive negative   01/07/17  15:52    


 


Ur Amphetamines Screen  Presumptive positive   01/07/17  15:52    


 


U Benzodiazepines Scrn  Presumptive negative   01/07/17  15:52    


 


Lithium  0.1 mmol/L (0.0-1.2)   01/10/17  05:50    


 


Urine Cocaine Screen  Presumptive negative   01/07/17  15:52    


 


U Marijuana (THC) Screen  Presumptive negative   01/07/17  15:52    


 


Drugs of Abuse Note  Disclamer   01/07/17  15:52    


 


Plasma/Serum Alcohol  < 0.01 gm% (0-0.07)   01/07/17  16:25

## 2017-01-17 RX ADMIN — FAMOTIDINE SCH MG: 20 TABLET ORAL at 11:11

## 2017-01-17 RX ADMIN — ENOXAPARIN SODIUM SCH: 100 INJECTION SUBCUTANEOUS at 11:12

## 2017-01-17 RX ADMIN — CEFTRIAXONE SODIUM SCH: 1 INJECTION, POWDER, FOR SOLUTION INTRAMUSCULAR; INTRAVENOUS at 14:13

## 2017-01-17 RX ADMIN — ZOLPIDEM TARTRATE PRN MG: 5 TABLET ORAL at 20:45

## 2017-01-17 RX ADMIN — FAMOTIDINE SCH MG: 20 TABLET ORAL at 20:45

## 2017-01-18 RX ADMIN — FAMOTIDINE SCH MG: 20 TABLET ORAL at 10:45

## 2017-01-18 RX ADMIN — FAMOTIDINE SCH MG: 20 TABLET ORAL at 22:27

## 2017-01-18 RX ADMIN — ENOXAPARIN SODIUM SCH: 100 INJECTION SUBCUTANEOUS at 10:46

## 2017-01-18 RX ADMIN — CEFTRIAXONE SODIUM SCH: 1 INJECTION, POWDER, FOR SOLUTION INTRAMUSCULAR; INTRAVENOUS at 10:46

## 2017-01-18 NOTE — PROGRESS NOTE
Assessment and Plan





- Patient Problems


(1) Bipolar disorder


Current Visit: Yes   Status: Acute   


Plan to address problem: 


psych consulted, Pending psych placement.











(2) Lithium toxicity


Current Visit: Yes   Status: Resolved   





(3) DVT prophylaxis


Current Visit: Yes   Status: Acute   





History


Interval history: 


Pt resting in bed, no reported nursing events. Pending placement. medically 

optimized,








Hospitalist Physical





- Constitutional


Vitals: 


 











Temp Pulse Resp BP Pulse Ox


 


 98.2 F   72   20   96/56   96 


 


 01/17/17 23:50  01/17/17 23:50  01/17/17 23:50  01/17/17 23:50  01/17/17 23:50











General appearance: Present: no acute distress, well-nourished





- EENT


Eyes: Present: PERRL


ENT: hearing intact





- Neck


Neck: Present: supple





- Respiratory


Respiratory: bilateral: diminished





- Cardiovascular


Rhythm: regular


Heart Sounds: Present: S1 & S2





- Extremities


Extremities: no ischemia


Peripheral Pulses: within normal limits





- Abdominal


General gastrointestinal: soft, non-tender, non-distended





- Integumentary


Integumentary: Present: clear, dry





- Psychiatric


Psychiatric: cooperative





- Neurologic


Neurologic: CNII-XII intact





Results





- Labs


CBC & Chem 7: 


 01/12/17 13:48





 01/12/17 13:48


Labs: 


 Laboratory Last Values











WBC  9.7 K/mm3 (4.5-11.0)   01/12/17  13:48    


 


RBC  4.78 M/mm3 (3.65-5.03)   01/12/17  13:48    


 


Hgb  13.9 gm/dl (10.1-14.3)   01/12/17  13:48    


 


Hct  42.0 % (30.3-42.9)   01/12/17  13:48    


 


MCV  88 fl (79-97)   01/12/17  13:48    


 


MCH  29 pg (28-32)   01/12/17  13:48    


 


MCHC  33 % (30-34)   01/12/17  13:48    


 


RDW  14.9 % (13.2-15.2)   01/12/17  13:48    


 


Plt Count  331 K/mm3 (140-440)   01/12/17  13:48    


 


Lymph % (Auto)  35.2 % (13.4-35.0)  H  01/12/17  13:48    


 


Mono % (Auto)  5.6 % (0.0-7.3)   01/12/17  13:48    


 


Eos % (Auto)  1.6 % (0.0-4.3)   01/12/17  13:48    


 


Baso % (Auto)  0.4 % (0.0-1.8)   01/12/17  13:48    


 


Lymph #  3.4 K/mm3 (1.2-5.4)   01/12/17  13:48    


 


Mono #  0.5 K/mm3 (0.0-0.8)   01/12/17  13:48    


 


Eos #  0.2 K/mm3 (0.0-0.4)   01/12/17  13:48    


 


Baso #  0.0 K/mm3 (0.0-0.1)   01/12/17  13:48    


 


Add Manual Diff  Complete   01/11/17  12:44    


 


Total Counted  100   01/11/17  12:44    


 


Seg Neutrophils %  57.2 % (40.0-70.0)   01/12/17  13:48    


 


Seg Neuts % (Manual)  56.0 % (40.0-70.0)   01/11/17  12:44    


 


Band Neutrophils %  0 %  01/11/17  12:44    


 


Lymphocytes % (Manual)  39.0 % (13.4-35.0)  H  01/11/17  12:44    


 


Reactive Lymphs % (Man)  0 %  01/11/17  12:44    


 


Monocytes % (Manual)  5.0 % (0.0-7.3)   01/11/17  12:44    


 


Eosinophils % (Manual)  0 % (0.0-4.3)   01/11/17  12:44    


 


Basophils % (Manual)  0 % (0.0-1.8)   01/11/17  12:44    


 


Metamyelocytes %  0 %  01/11/17  12:44    


 


Myelocytes %  0 %  01/11/17  12:44    


 


Promyelocytes %  0 %  01/11/17  12:44    


 


Blast Cells %  0 %  01/11/17  12:44    


 


Nucleated RBC %  Not Reportable   01/11/17  12:44    


 


Seg Neutrophils #  5.6 K/mm3 (1.8-7.7)   01/12/17  13:48    


 


Seg Neutrophils # Man  6.8 K/mm3 (1.8-7.7)   01/11/17  12:44    


 


Band Neutrophils #  0.0 K/mm3  01/11/17  12:44    


 


Lymphocytes # (Manual)  4.8 K/mm3 (1.2-5.4)   01/11/17  12:44    


 


Abs React Lymphs (Man)  0.0 K/mm3  01/11/17  12:44    


 


Monocytes # (Manual)  0.6 K/mm3 (0.0-0.8)   01/11/17  12:44    


 


Eosinophils # (Manual)  0.0 K/mm3 (0.0-0.4)   01/11/17  12:44    


 


Basophils # (Manual)  0.0 K/mm3 (0.0-0.1)   01/11/17  12:44    


 


Metamyelocytes #  0.0 K/mm3  01/11/17  12:44    


 


Myelocytes #  0.0 K/mm3  01/11/17  12:44    


 


Promyelocytes #  0.0 K/mm3  01/11/17  12:44    


 


Blast Cells #  0.0 K/mm3  01/11/17  12:44    


 


WBC Morphology  Not Reportable   01/11/17  12:44    


 


Hypersegmented Neuts  Not Reportable   01/11/17  12:44    


 


Hyposegmented Neuts  Not Reportable   01/11/17  12:44    


 


Hypogranular Neuts  Not Reportable   01/11/17  12:44    


 


Smudge Cells  Not Reportable   01/11/17  12:44    


 


Toxic Granulation  Not Reportable   01/11/17  12:44    


 


Toxic Vacuolation  Not Reportable   01/11/17  12:44    


 


Dohle Bodies  Not Reportable   01/11/17  12:44    


 


Pelger-Huet Anomaly  Not Reportable   01/11/17  12:44    


 


Imer Rods  Not Reportable   01/11/17  12:44    


 


Platelet Estimate  Appears normal   01/11/17  12:44    


 


Clumped Platelets  Not Reportable   01/11/17  12:44    


 


Plt Clumps, EDTA  Not Reportable   01/11/17  12:44    


 


Large Platelets  Not Reportable   01/11/17  12:44    


 


Giant Platelets  Not Reportable   01/11/17  12:44    


 


Platelet Satelliting  Not Reportable   01/11/17  12:44    


 


Plt Morphology Comment  Not Reportable   01/11/17  12:44    


 


RBC Morphology  Not Reportable   01/11/17  12:44    


 


Dimorphic RBCs  Not Reportable   01/11/17  12:44    


 


Polychromasia  Not Reportable   01/11/17  12:44    


 


Hypochromasia  Not Reportable   01/11/17  12:44    


 


Poikilocytosis  Not Reportable   01/11/17  12:44    


 


Anisocytosis  1+   01/11/17  12:44    


 


Microcytosis  Not Reportable   01/11/17  12:44    


 


Macrocytosis  Not Reportable   01/11/17  12:44    


 


Spherocytes  Not Reportable   01/11/17  12:44    


 


Pappenheimer Bodies  Not Reportable   01/11/17  12:44    


 


Sickle Cells  Not Reportable   01/11/17  12:44    


 


Target Cells  Not Reportable   01/11/17  12:44    


 


Tear Drop Cells  Not Reportable   01/11/17  12:44    


 


Ovalocytes  Not Reportable   01/11/17  12:44    


 


Helmet Cells  Not Reportable   01/11/17  12:44    


 


Guerrero-[place] Bodies  Not Reportable   01/11/17  12:44    


 


Cabot Rings  Not Reportable   01/11/17  12:44    


 


[place] Cells  Not Reportable   01/11/17  12:44    


 


Bite Cells  Not Reportable   01/11/17  12:44    


 


Crenated Cell  Not Reportable   01/11/17  12:44    


 


Elliptocytes  Not Reportable   01/11/17  12:44    


 


Acanthocytes (Spur)  Not Reportable   01/11/17  12:44    


 


Rouleaux  Not Reportable   01/11/17  12:44    


 


Hemoglobin C Crystals  Not Reportable   01/11/17  12:44    


 


Schistocytes  Not Reportable   01/11/17  12:44    


 


Malaria parasites  Not Reportable   01/11/17  12:44    


 


Tutu Bodies  Not Reportable   01/11/17  12:44    


 


Hem Pathologist Commnt  No   01/11/17  12:44    


 


Sodium  141 mmol/L (137-145)   01/12/17  13:48    


 


Potassium  4.7 mmol/L (3.6-5.0)   01/12/17  13:48    


 


Chloride  105.1 mmol/L ()   01/12/17  13:48    


 


Carbon Dioxide  21 mmol/L (22-30)  L  01/12/17  13:48    


 


Anion Gap  20 mmol/L  01/12/17  13:48    


 


BUN  11 mg/dL (7-17)   01/12/17  13:48    


 


Creatinine  0.6 mg/dL (0.7-1.2)  L  01/12/17  13:48    


 


Estimated GFR  > 60 ml/min  01/12/17  13:48    


 


BUN/Creatinine Ratio  18.33 %  01/12/17  13:48    


 


Glucose  129 mg/dL ()  H  01/12/17  13:48    


 


Calcium  8.8 mg/dL (8.4-10.2)   01/12/17  13:48    


 


Total Bilirubin  0.3 mg/dL (0.1-1.2)   01/08/17  16:21    


 


Direct Bilirubin  < 0.2 mg/dL (0-0.2)   01/07/17  16:25    


 


Indirect Bilirubin  0.0 mg/dL  01/07/17  16:25    


 


AST  15 units/L (5-40)   01/08/17  16:21    


 


ALT  12 units/L (7-56)   01/08/17  16:21    


 


Alkaline Phosphatase  70 units/L ()   01/08/17  16:21    


 


Total Creatine Kinase  50 units/L ()   01/08/17  16:21    


 


Total Protein  6.8 g/dL (6.3-8.2)   01/08/17  16:21    


 


Albumin  3.4 g/dL (3.9-5)  L  01/08/17  16:21    


 


Albumin/Globulin Ratio  1.0 %  01/08/17  16:21    


 


Urine Color  Rabia  (Yellow)   01/07/17  15:52    


 


Urine Turbidity  Slightly-cloudy  (Clear)   01/07/17  15:52    


 


Urine pH  5.0  (5.0-7.0)   01/07/17  15:52    


 


Ur Specific Gravity  1.024  (1.003-1.030)   01/07/17  15:52    


 


Urine Protein  30 mg/dl mg/dL (Negative)   01/07/17  15:52    


 


Urine Glucose (UA)  Neg mg/dL (Negative)   01/07/17  15:52    


 


Urine Ketones  Tr mg/dL (Negative)   01/07/17  15:52    


 


Urine Blood  Neg  (Negative)   01/07/17  15:52    


 


Urine Nitrite  Pos  (Negative)   01/07/17  15:52    


 


Urine Bilirubin  Neg  (Negative)   01/07/17  15:52    


 


Urine Urobilinogen  2.0 mg/dL (<2.0)   01/07/17  15:52    


 


Ur Leukocyte Esterase  Tr  (Negative)   01/07/17  15:52    


 


Urine WBC (Auto)  22.0 /HPF (0.0-6.0)  H  01/07/17  15:52    


 


Urine RBC (Auto)  3.0 /HPF (0.0-6.0)   01/07/17  15:52    


 


U Epithel Cells (Auto)  13.0 /HPF (0-13.0)   01/07/17  15:52    


 


Urine Bacteria (Auto)  1+ /HPF (Negative)   01/07/17  15:52    


 


Urine Mucus  3+ /HPF  01/07/17  15:52    


 


Urine HCG, Qual  Negative  (Negative)   01/07/17  15:52    


 


Salicylates  < 0.3 mg/dL (2.8-20.0)  L  01/07/17  16:25    


 


Urine Opiates Screen  Presumptive negative   01/07/17  15:52    


 


Urine Methadone Screen  Presumptive negative   01/07/17  15:52    


 


Acetaminophen  < 15.0 ug/mL (10.0-30.0)   01/07/17  16:25    


 


Ur Barbiturates Screen  Presumptive negative   01/07/17  15:52    


 


Ur Phencyclidine Scrn  Presumptive negative   01/07/17  15:52    


 


Ur Amphetamines Screen  Presumptive positive   01/07/17  15:52    


 


U Benzodiazepines Scrn  Presumptive negative   01/07/17  15:52    


 


Lithium  0.1 mmol/L (0.0-1.2)   01/10/17  05:50    


 


Urine Cocaine Screen  Presumptive negative   01/07/17  15:52    


 


U Marijuana (THC) Screen  Presumptive negative   01/07/17  15:52    


 


Drugs of Abuse Note  Disclamer   01/07/17  15:52    


 


Plasma/Serum Alcohol  < 0.01 gm% (0-0.07)   01/07/17  16:25

## 2017-01-19 VITALS — SYSTOLIC BLOOD PRESSURE: 138 MMHG | DIASTOLIC BLOOD PRESSURE: 74 MMHG

## 2017-01-19 RX ADMIN — ENOXAPARIN SODIUM SCH: 100 INJECTION SUBCUTANEOUS at 11:54

## 2017-01-19 RX ADMIN — FAMOTIDINE SCH MG: 20 TABLET ORAL at 13:20

## 2017-01-19 RX ADMIN — FAMOTIDINE SCH: 20 TABLET ORAL at 11:53

## 2017-01-19 NOTE — PROGRESS NOTE
Assessment and Plan





- Patient Problems


(1) Bipolar disorder


Current Visit: Yes   Status: Acute   


Plan to address problem: 


psych consulted, Pending psych placement.











(2) Lithium toxicity


Current Visit: Yes   Status: Resolved   





(3) DVT prophylaxis


Current Visit: Yes   Status: Acute   





History


Interval history: 


Pt resting in bed, no reported nursing events. Pending placement. medically 

optimized,








Hospitalist Physical





- Constitutional


Vitals: 


 











Temp Pulse Resp BP Pulse Ox


 


 98.0 F   80   16   103/61   99 


 


 01/19/17 08:10  01/19/17 08:10  01/19/17 08:10  01/19/17 08:10  01/19/17 08:10











General appearance: Present: no acute distress, well-nourished





- EENT


Eyes: Present: PERRL, EOM intact


ENT: hearing intact





- Neck


Neck: Present: supple





- Respiratory


Respiratory effort: normal


Respiratory: bilateral: CTA





- Cardiovascular


Rhythm: regular


Heart Sounds: Present: S1 & S2





- Extremities


Extremities: no ischemia


Peripheral Pulses: within normal limits





- Abdominal


General gastrointestinal: soft, non-tender, non-distended





- Integumentary


Integumentary: Present: clear, dry





- Psychiatric


Psychiatric: appropriate mood/affect, cooperative





- Neurologic


Neurologic: CNII-XII intact





Results





- Labs


CBC & Chem 7: 


 01/12/17 13:48





 01/12/17 13:48


Labs: 


 Laboratory Last Values











WBC  9.7 K/mm3 (4.5-11.0)   01/12/17  13:48    


 


RBC  4.78 M/mm3 (3.65-5.03)   01/12/17  13:48    


 


Hgb  13.9 gm/dl (10.1-14.3)   01/12/17  13:48    


 


Hct  42.0 % (30.3-42.9)   01/12/17  13:48    


 


MCV  88 fl (79-97)   01/12/17  13:48    


 


MCH  29 pg (28-32)   01/12/17  13:48    


 


MCHC  33 % (30-34)   01/12/17  13:48    


 


RDW  14.9 % (13.2-15.2)   01/12/17  13:48    


 


Plt Count  331 K/mm3 (140-440)   01/12/17  13:48    


 


Lymph % (Auto)  35.2 % (13.4-35.0)  H  01/12/17  13:48    


 


Mono % (Auto)  5.6 % (0.0-7.3)   01/12/17  13:48    


 


Eos % (Auto)  1.6 % (0.0-4.3)   01/12/17  13:48    


 


Baso % (Auto)  0.4 % (0.0-1.8)   01/12/17  13:48    


 


Lymph #  3.4 K/mm3 (1.2-5.4)   01/12/17  13:48    


 


Mono #  0.5 K/mm3 (0.0-0.8)   01/12/17  13:48    


 


Eos #  0.2 K/mm3 (0.0-0.4)   01/12/17  13:48    


 


Baso #  0.0 K/mm3 (0.0-0.1)   01/12/17  13:48    


 


Add Manual Diff  Complete   01/11/17  12:44    


 


Total Counted  100   01/11/17  12:44    


 


Seg Neutrophils %  57.2 % (40.0-70.0)   01/12/17  13:48    


 


Seg Neuts % (Manual)  56.0 % (40.0-70.0)   01/11/17  12:44    


 


Band Neutrophils %  0 %  01/11/17  12:44    


 


Lymphocytes % (Manual)  39.0 % (13.4-35.0)  H  01/11/17  12:44    


 


Reactive Lymphs % (Man)  0 %  01/11/17  12:44    


 


Monocytes % (Manual)  5.0 % (0.0-7.3)   01/11/17  12:44    


 


Eosinophils % (Manual)  0 % (0.0-4.3)   01/11/17  12:44    


 


Basophils % (Manual)  0 % (0.0-1.8)   01/11/17  12:44    


 


Metamyelocytes %  0 %  01/11/17  12:44    


 


Myelocytes %  0 %  01/11/17  12:44    


 


Promyelocytes %  0 %  01/11/17  12:44    


 


Blast Cells %  0 %  01/11/17  12:44    


 


Nucleated RBC %  Not Reportable   01/11/17  12:44    


 


Seg Neutrophils #  5.6 K/mm3 (1.8-7.7)   01/12/17  13:48    


 


Seg Neutrophils # Man  6.8 K/mm3 (1.8-7.7)   01/11/17  12:44    


 


Band Neutrophils #  0.0 K/mm3  01/11/17  12:44    


 


Lymphocytes # (Manual)  4.8 K/mm3 (1.2-5.4)   01/11/17  12:44    


 


Abs React Lymphs (Man)  0.0 K/mm3  01/11/17  12:44    


 


Monocytes # (Manual)  0.6 K/mm3 (0.0-0.8)   01/11/17  12:44    


 


Eosinophils # (Manual)  0.0 K/mm3 (0.0-0.4)   01/11/17  12:44    


 


Basophils # (Manual)  0.0 K/mm3 (0.0-0.1)   01/11/17  12:44    


 


Metamyelocytes #  0.0 K/mm3  01/11/17  12:44    


 


Myelocytes #  0.0 K/mm3  01/11/17  12:44    


 


Promyelocytes #  0.0 K/mm3  01/11/17  12:44    


 


Blast Cells #  0.0 K/mm3  01/11/17  12:44    


 


WBC Morphology  Not Reportable   01/11/17  12:44    


 


Hypersegmented Neuts  Not Reportable   01/11/17  12:44    


 


Hyposegmented Neuts  Not Reportable   01/11/17  12:44    


 


Hypogranular Neuts  Not Reportable   01/11/17  12:44    


 


Smudge Cells  Not Reportable   01/11/17  12:44    


 


Toxic Granulation  Not Reportable   01/11/17  12:44    


 


Toxic Vacuolation  Not Reportable   01/11/17  12:44    


 


Dohle Bodies  Not Reportable   01/11/17  12:44    


 


Pelger-Huet Anomaly  Not Reportable   01/11/17  12:44    


 


Imer Rods  Not Reportable   01/11/17  12:44    


 


Platelet Estimate  Appears normal   01/11/17  12:44    


 


Clumped Platelets  Not Reportable   01/11/17  12:44    


 


Plt Clumps, EDTA  Not Reportable   01/11/17  12:44    


 


Large Platelets  Not Reportable   01/11/17  12:44    


 


Giant Platelets  Not Reportable   01/11/17  12:44    


 


Platelet Satelliting  Not Reportable   01/11/17  12:44    


 


Plt Morphology Comment  Not Reportable   01/11/17  12:44    


 


RBC Morphology  Not Reportable   01/11/17  12:44    


 


Dimorphic RBCs  Not Reportable   01/11/17  12:44    


 


Polychromasia  Not Reportable   01/11/17  12:44    


 


Hypochromasia  Not Reportable   01/11/17  12:44    


 


Poikilocytosis  Not Reportable   01/11/17  12:44    


 


Anisocytosis  1+   01/11/17  12:44    


 


Microcytosis  Not Reportable   01/11/17  12:44    


 


Macrocytosis  Not Reportable   01/11/17  12:44    


 


Spherocytes  Not Reportable   01/11/17  12:44    


 


Pappenheimer Bodies  Not Reportable   01/11/17  12:44    


 


Sickle Cells  Not Reportable   01/11/17  12:44    


 


Target Cells  Not Reportable   01/11/17  12:44    


 


Tear Drop Cells  Not Reportable   01/11/17  12:44    


 


Ovalocytes  Not Reportable   01/11/17  12:44    


 


Helmet Cells  Not Reportable   01/11/17  12:44    


 


Guerrero-[place] Bodies  Not Reportable   01/11/17  12:44    


 


Cabot Rings  Not Reportable   01/11/17  12:44    


 


[place] Cells  Not Reportable   01/11/17  12:44    


 


Bite Cells  Not Reportable   01/11/17  12:44    


 


Crenated Cell  Not Reportable   01/11/17  12:44    


 


Elliptocytes  Not Reportable   01/11/17  12:44    


 


Acanthocytes (Spur)  Not Reportable   01/11/17  12:44    


 


Rouleaux  Not Reportable   01/11/17  12:44    


 


Hemoglobin C Crystals  Not Reportable   01/11/17  12:44    


 


Schistocytes  Not Reportable   01/11/17  12:44    


 


Malaria parasites  Not Reportable   01/11/17  12:44    


 


Tutu Bodies  Not Reportable   01/11/17  12:44    


 


Hem Pathologist Commnt  No   01/11/17  12:44    


 


Sodium  141 mmol/L (137-145)   01/12/17  13:48    


 


Potassium  4.7 mmol/L (3.6-5.0)   01/12/17  13:48    


 


Chloride  105.1 mmol/L ()   01/12/17  13:48    


 


Carbon Dioxide  21 mmol/L (22-30)  L  01/12/17  13:48    


 


Anion Gap  20 mmol/L  01/12/17  13:48    


 


BUN  11 mg/dL (7-17)   01/12/17  13:48    


 


Creatinine  0.6 mg/dL (0.7-1.2)  L  01/12/17  13:48    


 


Estimated GFR  > 60 ml/min  01/12/17  13:48    


 


BUN/Creatinine Ratio  18.33 %  01/12/17  13:48    


 


Glucose  129 mg/dL ()  H  01/12/17  13:48    


 


Calcium  8.8 mg/dL (8.4-10.2)   01/12/17  13:48    


 


Total Bilirubin  0.3 mg/dL (0.1-1.2)   01/08/17  16:21    


 


Direct Bilirubin  < 0.2 mg/dL (0-0.2)   01/07/17  16:25    


 


Indirect Bilirubin  0.0 mg/dL  01/07/17  16:25    


 


AST  15 units/L (5-40)   01/08/17  16:21    


 


ALT  12 units/L (7-56)   01/08/17  16:21    


 


Alkaline Phosphatase  70 units/L ()   01/08/17  16:21    


 


Total Creatine Kinase  50 units/L ()   01/08/17  16:21    


 


Total Protein  6.8 g/dL (6.3-8.2)   01/08/17  16:21    


 


Albumin  3.4 g/dL (3.9-5)  L  01/08/17  16:21    


 


Albumin/Globulin Ratio  1.0 %  01/08/17  16:21    


 


Urine Color  Rabia  (Yellow)   01/07/17  15:52    


 


Urine Turbidity  Slightly-cloudy  (Clear)   01/07/17  15:52    


 


Urine pH  5.0  (5.0-7.0)   01/07/17  15:52    


 


Ur Specific Gravity  1.024  (1.003-1.030)   01/07/17  15:52    


 


Urine Protein  30 mg/dl mg/dL (Negative)   01/07/17  15:52    


 


Urine Glucose (UA)  Neg mg/dL (Negative)   01/07/17  15:52    


 


Urine Ketones  Tr mg/dL (Negative)   01/07/17  15:52    


 


Urine Blood  Neg  (Negative)   01/07/17  15:52    


 


Urine Nitrite  Pos  (Negative)   01/07/17  15:52    


 


Urine Bilirubin  Neg  (Negative)   01/07/17  15:52    


 


Urine Urobilinogen  2.0 mg/dL (<2.0)   01/07/17  15:52    


 


Ur Leukocyte Esterase  Tr  (Negative)   01/07/17  15:52    


 


Urine WBC (Auto)  22.0 /HPF (0.0-6.0)  H  01/07/17  15:52    


 


Urine RBC (Auto)  3.0 /HPF (0.0-6.0)   01/07/17  15:52    


 


U Epithel Cells (Auto)  13.0 /HPF (0-13.0)   01/07/17  15:52    


 


Urine Bacteria (Auto)  1+ /HPF (Negative)   01/07/17  15:52    


 


Urine Mucus  3+ /HPF  01/07/17  15:52    


 


Urine HCG, Qual  Negative  (Negative)   01/07/17  15:52    


 


Salicylates  < 0.3 mg/dL (2.8-20.0)  L  01/07/17  16:25    


 


Urine Opiates Screen  Presumptive negative   01/07/17  15:52    


 


Urine Methadone Screen  Presumptive negative   01/07/17  15:52    


 


Acetaminophen  < 15.0 ug/mL (10.0-30.0)   01/07/17  16:25    


 


Ur Barbiturates Screen  Presumptive negative   01/07/17  15:52    


 


Ur Phencyclidine Scrn  Presumptive negative   01/07/17  15:52    


 


Ur Amphetamines Screen  Presumptive positive   01/07/17  15:52    


 


U Benzodiazepines Scrn  Presumptive negative   01/07/17  15:52    


 


Lithium  0.1 mmol/L (0.0-1.2)   01/10/17  05:50    


 


Urine Cocaine Screen  Presumptive negative   01/07/17  15:52    


 


U Marijuana (THC) Screen  Presumptive negative   01/07/17  15:52    


 


Drugs of Abuse Note  Disclamer   01/07/17  15:52    


 


Plasma/Serum Alcohol  < 0.01 gm% (0-0.07)   01/07/17  16:25

## 2017-01-19 NOTE — DISCHARGE SUMMARY
Providers





- Providers


Date of Admission: 


01/07/17 22:10





Attending physician: 


DENISSE MEAD





 





01/07/17 22:12


Consult to Mental Health [CONS] Routine 


   Reason For Exam: lithium toxicity+placement in psych facility once


   Place consult to:: 


   Notified:: 


   Was contact made?: Yes


   Comment:: KAYLA SPOKE WITH 











Primary care physician: 


PRIMARY CARE MD








Hospitalization


Condition: Fair


Hospital course: 


20 YO Female admitted for lithium toxicity. Pt treated with supportive care. Pt 

symptoms improved with therapy. Pt convalesced well during hospital course. 

Psych consulted. 1013 placed.  Pt medically optimized. Pt seen and evaluated 

prior to discharge but no significant new physical exam findings. Pt discharged 

to psych under care of the care of the medical director. Pt instructed to f/u 

pcp 1wk, 35 minutes dedicated to patient discharge. 








Disposition: DISCHARGED TO HOME OR SELFCARE





- Discharge Diagnoses


(1) Bipolar disorder


Status: Acute   





(2) Lithium toxicity


Status: Resolved   





(3) DVT prophylaxis


Status: Acute   





Core Measure Documentation





- Palliative Care


Palliative Care/ Comfort Measures: Not Applicable





- Core Measures


Any of the following diagnoses?: none





Exam





- Constitutional


Vitals: 


 











Temp Pulse Resp BP Pulse Ox


 


 98.0 F   80   16   103/61   99 


 


 01/19/17 08:10  01/19/17 08:10  01/19/17 08:10  01/19/17 08:10  01/19/17 08:10











General appearance: Present: no acute distress, well-nourished





- EENT


Eyes: Present: PERRL


ENT: hearing intact, clear oral mucosa





- Neck


Neck: Present: supple, normal ROM





- Respiratory


Respiratory effort: normal


Respiratory: bilateral: CTA





- Cardiovascular


Heart Sounds: Present: S1 & S2.  Absent: rub, click





- Extremities


Extremities: pulses symmetrical, No edema


Peripheral Pulses: within normal limits





- Abdominal


General gastrointestinal: Present: soft, non-tender, non-distended, normal 

bowel sounds


Female genitourinary: Present: normal





- Integumentary


Integumentary: Present: clear, warm, dry





- Musculoskeletal


Musculoskeletal: gait normal, strength equal bilaterally





- Psychiatric


Psychiatric: appropriate mood/affect, intact judgment & insight





- Neurologic


Neurologic: CNII-XII intact, moves all extremities





Plan


Activity: advance as tolerated


Follow up with: 


[facility] [Outside] - 7 Days


PRIMARY CARE,MD [Primary Care Provider] - 3-5 Days

## 2017-03-29 ENCOUNTER — HOSPITAL ENCOUNTER (EMERGENCY)
Dept: HOSPITAL 5 - ED | Age: 22
Discharge: LEFT BEFORE DISCHARGE | End: 2017-03-29
Payer: MEDICAID

## 2017-03-29 VITALS — DIASTOLIC BLOOD PRESSURE: 77 MMHG | SYSTOLIC BLOOD PRESSURE: 131 MMHG

## 2017-03-29 DIAGNOSIS — F31.9: ICD-10-CM

## 2017-03-29 DIAGNOSIS — G43.909: ICD-10-CM

## 2017-03-29 DIAGNOSIS — Z53.21: ICD-10-CM

## 2017-03-29 DIAGNOSIS — R50.9: ICD-10-CM

## 2017-03-29 DIAGNOSIS — F17.200: ICD-10-CM

## 2017-03-29 DIAGNOSIS — R10.9: Primary | ICD-10-CM

## 2017-03-29 DIAGNOSIS — R42: ICD-10-CM

## 2017-09-27 ENCOUNTER — HOSPITAL ENCOUNTER (EMERGENCY)
Dept: HOSPITAL 5 - ED | Age: 22
LOS: 1 days | Discharge: LEFT BEFORE DISCHARGE | End: 2017-09-28
Payer: MEDICAID

## 2017-09-27 VITALS — SYSTOLIC BLOOD PRESSURE: 128 MMHG | DIASTOLIC BLOOD PRESSURE: 87 MMHG

## 2017-09-27 DIAGNOSIS — Z53.21: ICD-10-CM

## 2017-09-27 DIAGNOSIS — R50.9: Primary | ICD-10-CM

## 2017-10-20 ENCOUNTER — HOSPITAL ENCOUNTER (EMERGENCY)
Dept: HOSPITAL 5 - ED | Age: 22
LOS: 1 days | Discharge: PSYCHIATRIC OR SUBSTANCE USE FACILITY | End: 2017-10-21
Payer: MEDICAID

## 2017-10-20 DIAGNOSIS — F32.9: ICD-10-CM

## 2017-10-20 DIAGNOSIS — F31.64: Primary | ICD-10-CM

## 2017-10-20 DIAGNOSIS — F17.200: ICD-10-CM

## 2017-10-20 LAB
ANION GAP SERPL CALC-SCNC: 17 MMOL/L
BACTERIA #/AREA URNS HPF: (no result) /HPF
BASOPHILS NFR BLD AUTO: 0.2 % (ref 0–1.8)
BILIRUB UR QL STRIP: (no result)
BLOOD UR QL VISUAL: (no result)
BUN SERPL-MCNC: 9 MG/DL (ref 7–17)
BUN/CREAT SERPL: 10 %
CALCIUM SERPL-MCNC: 9.1 MG/DL (ref 8.4–10.2)
CHLORIDE SERPL-SCNC: 104.2 MMOL/L (ref 98–107)
CO2 SERPL-SCNC: 25 MMOL/L (ref 22–30)
EOSINOPHIL NFR BLD AUTO: 1.6 % (ref 0–4.3)
GLUCOSE SERPL-MCNC: 96 MG/DL (ref 65–100)
HCT VFR BLD CALC: 38.1 % (ref 30.3–42.9)
HGB BLD-MCNC: 13.2 GM/DL (ref 10.1–14.3)
KETONES UR STRIP-MCNC: (no result) MG/DL
LEUKOCYTE ESTERASE UR QL STRIP: (no result)
MCH RBC QN AUTO: 31 PG (ref 28–32)
MCHC RBC AUTO-ENTMCNC: 35 % (ref 30–34)
MCV RBC AUTO: 90 FL (ref 79–97)
NITRITE UR QL STRIP: (no result)
PH UR STRIP: 6 [PH] (ref 5–7)
PLATELET # BLD: 326 K/MM3 (ref 140–440)
POTASSIUM SERPL-SCNC: 4.1 MMOL/L (ref 3.6–5)
PROT UR STRIP-MCNC: (no result) MG/DL
RBC # BLD AUTO: 4.25 M/MM3 (ref 3.65–5.03)
RBC #/AREA URNS HPF: 1 /HPF (ref 0–6)
SODIUM SERPL-SCNC: 142 MMOL/L (ref 137–145)
URINE DRUGS OF ABUSE NOTE: (no result)
UROBILINOGEN UR-MCNC: < 2 MG/DL (ref ?–2)
WBC # BLD AUTO: 17.5 K/MM3 (ref 4.5–11)
WBC #/AREA URNS HPF: 1 /HPF (ref 0–6)

## 2017-10-20 PROCEDURE — 80320 DRUG SCREEN QUANTALCOHOLS: CPT

## 2017-10-20 PROCEDURE — 99285 EMERGENCY DEPT VISIT HI MDM: CPT

## 2017-10-20 PROCEDURE — 36415 COLL VENOUS BLD VENIPUNCTURE: CPT

## 2017-10-20 PROCEDURE — 85025 COMPLETE CBC W/AUTO DIFF WBC: CPT

## 2017-10-20 PROCEDURE — 81001 URINALYSIS AUTO W/SCOPE: CPT

## 2017-10-20 PROCEDURE — G0480 DRUG TEST DEF 1-7 CLASSES: HCPCS

## 2017-10-20 PROCEDURE — 80048 BASIC METABOLIC PNL TOTAL CA: CPT

## 2017-10-20 PROCEDURE — 84703 CHORIONIC GONADOTROPIN ASSAY: CPT

## 2017-10-20 PROCEDURE — 80307 DRUG TEST PRSMV CHEM ANLYZR: CPT

## 2017-10-20 NOTE — EMERGENCY DEPARTMENT REPORT
ED General Adult HPI





- General


Chief complaint: Psych


Stated complaint: MH


Time Seen by Provider: 10/20/17 23:01


Source: patient


Mode of arrival: Ambulatory


Limitations: No Limitations





- History of Present Illness


Initial comments: 





Patient is a 22-year-old female past medical history of schizophrenia and 

unknown psychiatric disorder who presents with psychosis.  History is obtained 

by patient's aunt (who has medical authority over her) she states that patient 

has been roaming around the streets and has not been able to take care of her 

daughter.  She's been yelling and hearing voices.  A neighbor called the police 

and DCFS was involved patient was recently discharged from her records however 

she has not been taking any of her psychiatric medication for about 8 months 

per her niece.  Patient also voiced to police that she was suicidal which is 

why she was brought to the ER.


Severity scale (0 -10): 0





- Related Data


 Home Medications











 Medication  Instructions  Recorded  Confirmed  Last Taken


 


Lithium  01/07/17  Unknown


 


Wellbutrin  01/07/17  Unknown











 Allergies











Allergy/AdvReac Type Severity Reaction Status Date / Time


 


No Known Allergies Allergy   Verified 01/07/17 22:24














ED Review of Systems


ROS: 


Stated complaint: MH


Other details as noted in HPI





Constitutional: denies: chills, fever


Eyes: denies: eye pain, eye discharge, vision change


ENT: denies: ear pain, throat pain


Respiratory: denies: cough, shortness of breath, wheezing


Cardiovascular: denies: chest pain, palpitations


Endocrine: no symptoms reported


Gastrointestinal: denies: abdominal pain, nausea, diarrhea


Genitourinary: denies: urgency, dysuria, discharge


Musculoskeletal: denies: back pain, joint swelling, arthralgia


Skin: denies: rash, lesions


Neurological: denies: headache, weakness, paresthesias


Psychiatric: anxiety, auditory hallucinations, homicidal thoughts, suicidal 

thoughts.  denies: depression


Hematological/Lymphatic: denies: easy bleeding, easy bruising





ED Past Medical Hx





- Past Medical History


Hx Psychiatric Treatment: Yes (bipolar, episodic mood disorder, schizophrenia,

Suicidal Attempts.)


Hx HIV: No


Additional medical history: Pt stated that she suffered from migraine, 

depression





- Surgical History


Past Surgical History?: No





- Social History


Smoking Status: Current Every Day Smoker


Substance Use Type: None





- Medications


Home Medications: 


 Home Medications











 Medication  Instructions  Recorded  Confirmed  Last Taken  Type


 


Lithium  01/07/17  Unknown History


 


Wellbutrin  01/07/17  Unknown History














ED Physical Exam





- General


Limitations: No Limitations


General appearance: alert, in no apparent distress





- Head


Head exam: Present: atraumatic, normocephalic





- Eye


Eye exam: Present: normal appearance





- ENT


ENT exam: Present: mucous membranes moist





- Neck


Neck exam: Present: normal inspection





- Respiratory


Respiratory exam: Present: normal lung sounds bilaterally.  Absent: respiratory 

distress





- Cardiovascular


Cardiovascular Exam: Present: regular rate, normal rhythm.  Absent: systolic 

murmur, diastolic murmur, rubs, gallop





- GI/Abdominal


GI/Abdominal exam: Present: soft, normal bowel sounds





- Extremities Exam


Extremities exam: Present: normal inspection





- Back Exam


Back exam: Present: normal inspection





- Neurological Exam


Neurological exam: Present: alert, oriented X3





- Psychiatric


Psychiatric exam: Present: agitated, anxious, suicidal ideation





- Skin


Skin exam: Present: warm, dry, intact, normal color.  Absent: rash





ED Course


 Vital Signs











  10/20/17





  21:40


 


Temperature 98.2 F


 


Pulse Rate 113 H


 


Respiratory 16





Rate 


 


Blood Pressure 119/84


 


Blood Pressure 119/84





[Left] 


 


O2 Sat by Pulse 100





Oximetry 














ED Medical Decision Making





- Lab Data


Result diagrams: 


 10/20/17 22:04





 10/20/17 22:05








Labs











  10/20/17 10/20/17 10/20/17





  21:58 21:58 21:59


 


WBC   


 


RBC   


 


Hgb   


 


Hct   


 


MCV   


 


MCH   


 


MCHC   


 


RDW   


 


Plt Count   


 


Lymph % (Auto)   


 


Mono % (Auto)   


 


Eos % (Auto)   


 


Baso % (Auto)   


 


Lymph #   


 


Mono #   


 


Eos #   


 


Baso #   


 


Seg Neutrophils %   


 


Seg Neutrophils #   


 


Carbon Dioxide   


 


BUN   


 


Creatinine   


 


Estimated GFR   


 


BUN/Creatinine Ratio   


 


Glucose   


 


Calcium   


 


HCG, Qual    Negative


 


Urine Color  Yellow  


 


Urine Turbidity  Clear  


 


Urine pH  6.0  


 


Ur Specific Gravity  1.012  


 


Urine Protein  <15 mg/dl  


 


Urine Glucose (UA)  Neg  


 


Urine Ketones  Neg  


 


Urine Blood  Neg  


 


Urine Nitrite  Neg  


 


Urine Bilirubin  Neg  


 


Urine Urobilinogen  < 2.0  


 


Ur Leukocyte Esterase  Neg  


 


Urine WBC (Auto)  1.0  


 


Urine RBC (Auto)  1.0  


 


U Epithel Cells (Auto)  10.0  


 


Urine Bacteria (Auto)  1+  


 


Urine Opiates Screen   Presumptive negative 


 


Urine Methadone Screen   Presumptive negative 


 


Ur Barbiturates Screen   Presumptive negative 


 


Ur Phencyclidine Scrn   Presumptive negative 


 


Ur Amphetamines Screen   Presumptive negative 


 


U Benzodiazepines Scrn   Presumptive negative 


 


Urine Cocaine Screen   Presumptive negative 


 


U Marijuana (THC) Screen   Presumptive negative 


 


Drugs of Abuse Note   Disclamer 


 


Plasma/Serum Alcohol   














  10/20/17 10/20/17 10/20/17





  22:04 22:05 22:06


 


WBC  17.5 H  


 


RBC  4.25  


 


Hgb  13.2  


 


Hct  38.1  


 


MCV  90  


 


MCH  31  


 


MCHC  35 H  


 


RDW  15.1  


 


Plt Count  326  


 


Lymph % (Auto)  21.4  


 


Mono % (Auto)  5.3  


 


Eos % (Auto)  1.6  


 


Baso % (Auto)  0.2  


 


Lymph #  3.7  


 


Mono #  0.9 H  


 


Eos #  0.3  


 


Baso #  0.0  


 


Seg Neutrophils %  71.5 H  


 


Seg Neutrophils #  12.5 H  


 


Carbon Dioxide   25 


 


BUN   9 


 


Creatinine   0.9 


 


Estimated GFR   > 60 


 


BUN/Creatinine Ratio   10 


 


Glucose   96 


 


Calcium   9.1 


 


HCG, Qual   


 


Urine Color   


 


Urine Turbidity   


 


Urine pH   


 


Ur Specific Gravity   


 


Urine Protein   


 


Urine Glucose (UA)   


 


Urine Ketones   


 


Urine Blood   


 


Urine Nitrite   


 


Urine Bilirubin   


 


Urine Urobilinogen   


 


Ur Leukocyte Esterase   


 


Urine WBC (Auto)   


 


Urine RBC (Auto)   


 


U Epithel Cells (Auto)   


 


Urine Bacteria (Auto)   


 


Urine Opiates Screen   


 


Urine Methadone Screen   


 


Ur Barbiturates Screen   


 


Ur Phencyclidine Scrn   


 


Ur Amphetamines Screen   


 


U Benzodiazepines Scrn   


 


Urine Cocaine Screen   


 


U Marijuana (THC) Screen   


 


Drugs of Abuse Note   


 


Plasma/Serum Alcohol    < 0.01














- Medical Decision Making





Chief medical diagnosis: Psychosis


Differential diagnosis: Substance induced mood disorder, bipolar disorder, 

metabolic abnormality, urgency





I will get CBC, CMP, urinalysis, urine pregnancy, UA, mental health worker 

evaluation





Due to patient having a psych history and story from patient's on his 

concerning for suicidal ideation and psychotic behavior patient will be 1013 

and she'll be seen by psychiatrist.  Discussed with patient's on that patient 

will be a 1013 to the emergency department.  She agrees with plan patient's 

aunt will be back in the morning.


Critical care attestation.: 


If time is entered above; I have spent that time in minutes in the direct care 

of this critically ill patient, excluding procedure time.








ED Disposition


Clinical Impression: 


Bipolar disorder


Qualifiers:


 Active/Remission status: currently active Current bipolar episode type: mixed 

Current episode severity: severe Psychotic features: with psychotic features 

Qualified Code(s): F31.64 - Bipolar disorder, current episode mixed, severe, 

with psychotic features





Disposition: DC/TX-65 PSY HOSP/PSY UNIT


Is pt being admited?: No


Does the pt Need Aspirin: No


Condition: Stable


Referrals: 


PRIMARY CARE,MD [Primary Care Provider] - 3-5 Days

## 2017-10-21 VITALS — SYSTOLIC BLOOD PRESSURE: 107 MMHG | DIASTOLIC BLOOD PRESSURE: 41 MMHG
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
## 2018-01-01 ENCOUNTER — HOSPITAL ENCOUNTER (EMERGENCY)
Dept: HOSPITAL 5 - ED | Age: 23
LOS: 1 days | Discharge: LEFT BEFORE DISCHARGE | End: 2018-01-02
Payer: MEDICAID

## 2018-01-01 VITALS — SYSTOLIC BLOOD PRESSURE: 119 MMHG | DIASTOLIC BLOOD PRESSURE: 79 MMHG

## 2018-01-01 DIAGNOSIS — Z53.21: Primary | ICD-10-CM

## 2019-01-18 ENCOUNTER — HOSPITAL ENCOUNTER (EMERGENCY)
Dept: HOSPITAL 5 - ED | Age: 24
LOS: 1 days | Discharge: PSYCHIATRIC OR SUBSTANCE USE FACILITY | End: 2019-01-19
Payer: MEDICAID

## 2019-01-18 DIAGNOSIS — Z79.899: ICD-10-CM

## 2019-01-18 DIAGNOSIS — G43.909: ICD-10-CM

## 2019-01-18 DIAGNOSIS — F31.30: Primary | ICD-10-CM

## 2019-01-18 LAB
BACTERIA #/AREA URNS HPF: (no result) /HPF
BASOPHILS # (AUTO): 0 K/MM3 (ref 0–0.1)
BASOPHILS NFR BLD AUTO: 0.2 % (ref 0–1.8)
BENZODIAZEPINES SCREEN,URINE: (no result)
BILIRUB UR QL STRIP: (no result)
BLOOD UR QL VISUAL: (no result)
BUN SERPL-MCNC: 4 MG/DL (ref 7–17)
BUN/CREAT SERPL: 7 %
CALCIUM SERPL-MCNC: 9.2 MG/DL (ref 8.4–10.2)
EOSINOPHIL # BLD AUTO: 0.3 K/MM3 (ref 0–0.4)
EOSINOPHIL NFR BLD AUTO: 1.5 % (ref 0–4.3)
HCT VFR BLD CALC: 40.4 % (ref 30.3–42.9)
HEMOLYSIS INDEX: 8
HGB BLD-MCNC: 13.8 GM/DL (ref 10.1–14.3)
LYMPHOCYTES # BLD AUTO: 4.3 K/MM3 (ref 1.2–5.4)
LYMPHOCYTES NFR BLD AUTO: 25.4 % (ref 13.4–35)
MCHC RBC AUTO-ENTMCNC: 34 % (ref 30–34)
MCV RBC AUTO: 91 FL (ref 79–97)
METHADONE SCREEN,URINE: (no result)
MONOCYTES # (AUTO): 1.2 K/MM3 (ref 0–0.8)
MONOCYTES % (AUTO): 6.9 % (ref 0–7.3)
OPIATE SCREEN,URINE: (no result)
PH UR STRIP: 7 [PH] (ref 5–7)
PLATELET # BLD: 302 K/MM3 (ref 140–440)
PROT UR STRIP-MCNC: (no result) MG/DL
RBC # BLD AUTO: 4.44 M/MM3 (ref 3.65–5.03)
RBC #/AREA URNS HPF: < 1 /HPF (ref 0–6)
UROBILINOGEN UR-MCNC: < 2 MG/DL (ref ?–2)
WBC #/AREA URNS HPF: 3 /HPF (ref 0–6)

## 2019-01-18 PROCEDURE — 81025 URINE PREGNANCY TEST: CPT

## 2019-01-18 PROCEDURE — G0480 DRUG TEST DEF 1-7 CLASSES: HCPCS

## 2019-01-18 PROCEDURE — 99285 EMERGENCY DEPT VISIT HI MDM: CPT

## 2019-01-18 PROCEDURE — 81001 URINALYSIS AUTO W/SCOPE: CPT

## 2019-01-18 PROCEDURE — 80320 DRUG SCREEN QUANTALCOHOLS: CPT

## 2019-01-18 PROCEDURE — 36415 COLL VENOUS BLD VENIPUNCTURE: CPT

## 2019-01-18 PROCEDURE — 80048 BASIC METABOLIC PNL TOTAL CA: CPT

## 2019-01-18 PROCEDURE — 80307 DRUG TEST PRSMV CHEM ANLYZR: CPT

## 2019-01-18 PROCEDURE — 85025 COMPLETE CBC W/AUTO DIFF WBC: CPT

## 2019-01-18 NOTE — EMERGENCY DEPARTMENT REPORT
ED Psych HPI





- General


Chief Complaint: Psych


Stated Complaint: SUICIDAL


Time Seen by Provider: 01/18/19 20:30


Source: patient


Mode of arrival: Ambulatory





- History of Present Illness


Initial Comments: 


Patient is a 23-year-old female who presents with suicidal ideation.  Patient h

as a history of depression and takes N [place] and Wellbutrin.  She has not been 

compliant with her medications.  Her plan was to slit her wrists patient denies 

hearing voices and denies having any homicidal ideation.  Her depression and 

suicide ideation or severe nothing makes it better and nothing makes it worse.  

Patient is accompanied by her aunt and states that her stressor was interacting 

with her mother.





- Related Data


                                Home Medications











 Medication  Instructions  Recorded  Confirmed  Last Taken


 


Divalproex Dr [Depakote] 1,000 mg PO HS 01/20/14 01/20/14 01/05/14


 


Divalproex Dr [Depakote] 500 PO DAILY 01/20/14 01/20/14 01/05/14


 


Mirtazapine [Remeron] 30 mg PO HS 01/20/14 01/20/14 01/05/14


 


QUEtiapine [SEROquel] 400 mg PO DAILY 01/20/14 01/20/14 01/06/14


 


Citalopram [celeXA] 20 mg PO QDAY 09/09/15 09/09/15 Unknown


 


Lithium Carbonate [Lithium 450 mg PO BID 09/09/15 09/09/15 Unknown





Carbonate ER]    


 


diphenhydrAMINE [Benadryl CAP] 25 mg PO QHS PRN 09/09/15 09/09/15 Unknown


 


risperiDONE [RisperiDONE] 2 mg PO BID 09/09/15 09/09/15 Unknown


 


Lithium 300 mg PO BID 01/07/17 10/21/17 Unknown


 


Wellbutrin 75 mg PO DAILY 01/07/17 10/21/17 Unknown











                                    Allergies











Allergy/AdvReac Type Severity Reaction Status Date / Time


 


No Known Allergies Allergy   Verified 09/09/15 04:44














ED Review of Systems


ROS: 


Stated complaint: SUICIDAL


Other details as noted in HPI





Constitutional: denies: chills, fever


Eyes: denies: eye pain, eye discharge, vision change


ENT: denies: ear pain, throat pain


Respiratory: denies: cough, shortness of breath, wheezing


Cardiovascular: denies: chest pain, palpitations


Endocrine: no symptoms reported


Gastrointestinal: denies: abdominal pain, nausea, diarrhea


Genitourinary: denies: urgency, dysuria, discharge


Musculoskeletal: denies: back pain, joint swelling, arthralgia


Skin: denies: rash, lesions


Neurological: denies: headache, weakness, paresthesias


Psychiatric: depression, suicidal thoughts.  denies: anxiety


Hematological/Lymphatic: denies: easy bleeding, easy bruising





ED Past Medical Hx





- Past Medical History


Hx Psychiatric Treatment: Yes (depression, manic, schizo)


Hx HIV: No


Additional medical history: Pt stated that she suffered from migraine, 

depression





- Surgical History


Past Surgical History?: No





- Social History


Smoking Status: Never Smoker


Substance Use Type: None





- Medications


Home Medications: 


                                Home Medications











 Medication  Instructions  Recorded  Confirmed  Last Taken  Type


 


Divalproex Dr [Depakote] 1,000 mg PO HS 01/20/14 01/20/14 01/05/14 History


 


Divalproex Dr [Depakote] 500 PO DAILY 01/20/14 01/20/14 01/05/14 History


 


Mirtazapine [Remeron] 30 mg PO HS 01/20/14 01/20/14 01/05/14 History


 


QUEtiapine [SEROquel] 400 mg PO DAILY 01/20/14 01/20/14 01/06/14 History


 


Citalopram [celeXA] 20 mg PO QDAY 09/09/15 09/09/15 Unknown History


 


Lithium Carbonate [Lithium 450 mg PO BID 09/09/15 09/09/15 Unknown History





Carbonate ER]     


 


diphenhydrAMINE [Benadryl CAP] 25 mg PO QHS PRN 09/09/15 09/09/15 Unknown 

History


 


risperiDONE [RisperiDONE] 2 mg PO BID 09/09/15 09/09/15 Unknown History


 


Lithium 300 mg PO BID 01/07/17 10/21/17 Unknown History


 


Wellbutrin 75 mg PO DAILY 01/07/17 10/21/17 Unknown History














ED Physical Exam





- General


Limitations: No Limitations


General appearance: alert, in no apparent distress





- Head


Head exam: Present: atraumatic, normocephalic





- Eye


Eye exam: Present: normal appearance





- ENT


ENT exam: Present: mucous membranes moist





- Neck


Neck exam: Present: normal inspection





- Respiratory


Respiratory exam: Present: normal lung sounds bilaterally.  Absent: respiratory 

distress





- Cardiovascular


Cardiovascular Exam: Present: regular rate, normal rhythm.  Absent: systolic 

murmur, diastolic murmur, rubs, gallop





- GI/Abdominal


GI/Abdominal exam: Present: soft, normal bowel sounds





- Extremities Exam


Extremities exam: Present: normal inspection





- Back Exam


Back exam: Present: normal inspection





- Neurological Exam


Neurological exam: Present: alert, oriented X3





- Psychiatric


Psychiatric exam: Present: anxious, suicidal ideation





- Skin


Skin exam: Present: warm, dry, intact, normal color.  Absent: rash





ED Course


                                   Vital Signs











  01/18/19





  20:02


 


Temperature 97.5 F L


 


Pulse Rate 103 H


 


Respiratory 16





Rate 


 


Blood Pressure 125/69


 


O2 Sat by Pulse 100





Oximetry 














ED Medical Decision Making





- Lab Data


Result diagrams: 


                                 01/18/19 21:04





                                 01/18/19 21:04








                                   Lab Results











  01/18/19 01/18/19 01/18/19 Range/Units





  20:50 20:50 20:50 


 


WBC     (4.5-11.0)  K/mm3


 


RBC     (3.65-5.03)  M/mm3


 


Hgb     (10.1-14.3)  gm/dl


 


Hct     (30.3-42.9)  %


 


MCV     (79-97)  fl


 


MCH     (28-32)  pg


 


MCHC     (30-34)  %


 


RDW     (13.2-15.2)  %


 


Plt Count     (140-440)  K/mm3


 


Lymph % (Auto)     (13.4-35.0)  %


 


Mono % (Auto)     (0.0-7.3)  %


 


Eos % (Auto)     (0.0-4.3)  %


 


Baso % (Auto)     (0.0-1.8)  %


 


Lymph #     (1.2-5.4)  K/mm3


 


Mono #     (0.0-0.8)  K/mm3


 


Eos #     (0.0-0.4)  K/mm3


 


Baso #     (0.0-0.1)  K/mm3


 


Seg Neutrophils %     (40.0-70.0)  %


 


Seg Neutrophils #     (1.8-7.7)  K/mm3


 


Sodium     (137-145)  mmol/L


 


Potassium     (3.6-5.0)  mmol/L


 


Chloride     ()  mmol/L


 


Carbon Dioxide     (22-30)  mmol/L


 


Anion Gap     mmol/L


 


BUN     (7-17)  mg/dL


 


Creatinine     (0.7-1.2)  mg/dL


 


Estimated GFR     ml/min


 


BUN/Creatinine Ratio     %


 


Glucose     ()  mg/dL


 


Calcium     (8.4-10.2)  mg/dL


 


Urine Color  Yellow    (Yellow)  


 


Urine Turbidity  Clear    (Clear)  


 


Urine pH  7.0    (5.0-7.0)  


 


Ur Specific Gravity  1.003    (1.003-1.030)  


 


Urine Protein  <15 mg/dl    (Negative)  mg/dL


 


Urine Glucose (UA)  Neg    (Negative)  mg/dL


 


Urine Ketones  Neg    (Negative)  mg/dL


 


Urine Blood  Neg    (Negative)  


 


Urine Nitrite  Neg    (Negative)  


 


Urine Bilirubin  Neg    (Negative)  


 


Urine Urobilinogen  < 2.0    (<2.0)  mg/dL


 


Ur Leukocyte Esterase  Neg    (Negative)  


 


Urine WBC (Auto)  3.0    (0.0-6.0)  /HPF


 


Urine RBC (Auto)  < 1.0    (0.0-6.0)  /HPF


 


U Epithel Cells (Auto)  5.0    (0-13.0)  /HPF


 


Urine Bacteria (Auto)  1+    (Negative)  /HPF


 


Urine HCG, Qual    Negative  (Negative)  


 


Urine Opiates Screen   Presumptive negative   


 


Urine Methadone Screen   Presumptive negative   


 


Ur Barbiturates Screen   Presumptive negative   


 


Ur Phencyclidine Scrn   Presumptive negative   


 


U Benzodiazepines Scrn   Presumptive negative   


 


Urine Cocaine Screen   Presumptive negative   


 


U Marijuana (THC) Screen   Presumptive negative   


 


Drugs of Abuse Note   Disclamer   


 


Plasma/Serum Alcohol     (0-0.07)  %














  01/18/19 01/18/19 01/18/19 Range/Units





  21:04 21:04 21:04 


 


WBC    16.9 H  (4.5-11.0)  K/mm3


 


RBC    4.44  (3.65-5.03)  M/mm3


 


Hgb    13.8  (10.1-14.3)  gm/dl


 


Hct    40.4  (30.3-42.9)  %


 


MCV    91  (79-97)  fl


 


MCH    31  (28-32)  pg


 


MCHC    34  (30-34)  %


 


RDW    13.5  (13.2-15.2)  %


 


Plt Count    302  (140-440)  K/mm3


 


Lymph % (Auto)    25.4  (13.4-35.0)  %


 


Mono % (Auto)    6.9  (0.0-7.3)  %


 


Eos % (Auto)    1.5  (0.0-4.3)  %


 


Baso % (Auto)    0.2  (0.0-1.8)  %


 


Lymph #    4.3  (1.2-5.4)  K/mm3


 


Mono #    1.2 H  (0.0-0.8)  K/mm3


 


Eos #    0.3  (0.0-0.4)  K/mm3


 


Baso #    0.0  (0.0-0.1)  K/mm3


 


Seg Neutrophils %    66.0  (40.0-70.0)  %


 


Seg Neutrophils #    11.2 H  (1.8-7.7)  K/mm3


 


Sodium  140    (137-145)  mmol/L


 


Potassium  4.0    (3.6-5.0)  mmol/L


 


Chloride  101.8    ()  mmol/L


 


Carbon Dioxide  27    (22-30)  mmol/L


 


Anion Gap  15    mmol/L


 


BUN  4 L    (7-17)  mg/dL


 


Creatinine  0.6 L    (0.7-1.2)  mg/dL


 


Estimated GFR  > 60    ml/min


 


BUN/Creatinine Ratio  7    %


 


Glucose  86    ()  mg/dL


 


Calcium  9.2    (8.4-10.2)  mg/dL


 


Urine Color     (Yellow)  


 


Urine Turbidity     (Clear)  


 


Urine pH     (5.0-7.0)  


 


Ur Specific Gravity     (1.003-1.030)  


 


Urine Protein     (Negative)  mg/dL


 


Urine Glucose (UA)     (Negative)  mg/dL


 


Urine Ketones     (Negative)  mg/dL


 


Urine Blood     (Negative)  


 


Urine Nitrite     (Negative)  


 


Urine Bilirubin     (Negative)  


 


Urine Urobilinogen     (<2.0)  mg/dL


 


Ur Leukocyte Esterase     (Negative)  


 


Urine WBC (Auto)     (0.0-6.0)  /HPF


 


Urine RBC (Auto)     (0.0-6.0)  /HPF


 


U Epithel Cells (Auto)     (0-13.0)  /HPF


 


Urine Bacteria (Auto)     (Negative)  /HPF


 


Urine HCG, Qual     (Negative)  


 


Urine Opiates Screen     


 


Urine Methadone Screen     


 


Ur Barbiturates Screen     


 


Ur Phencyclidine Scrn     


 


U Benzodiazepines Scrn     


 


Urine Cocaine Screen     


 


U Marijuana (THC) Screen     


 


Drugs of Abuse Note     


 


Plasma/Serum Alcohol   < 0.01   (0-0.07)  %














- Medical Decision Making





Chief medical diagnosis: Major depressive disorder


Differential diagnosis: Suicidal ideation, substance induced mood disorder





I will get CBC BMP and urinalysis urine drug screen and I will sign 1013 on 

patient in house psych to assess patient.





Patient's lower chest shows mild leukocytosis but she has been medically 

cleared.


Critical care attestation.: 


If time is entered above; I have spent that time in minutes in the direct care 

of this critically ill patient, excluding procedure time.








ED Disposition


Clinical Impression: 


 Suicidal ideations





Bipolar disorder


Qualifiers:


 Active/Remission status: currently active Current bipolar episode type: 

depressed Current episode severity: unspecified Qualified Code(s): F31.30 - 

Bipolar disorder, current episode depressed, mild or moderate severity, 

unspecified





Disposition: DC/TX-65 PSY HOSP/PSY UNIT


Is pt being admited?: No


Does the pt Need Aspirin: No


Condition: Stable

## 2019-01-19 VITALS — SYSTOLIC BLOOD PRESSURE: 120 MMHG | DIASTOLIC BLOOD PRESSURE: 75 MMHG

## 2019-10-29 ENCOUNTER — HOSPITAL ENCOUNTER (EMERGENCY)
Dept: HOSPITAL 5 - ED | Age: 24
LOS: 1 days | Discharge: PSYCHIATRIC OR SUBSTANCE USE FACILITY | End: 2019-10-30
Payer: MEDICAID

## 2019-10-29 DIAGNOSIS — Z91.14: ICD-10-CM

## 2019-10-29 DIAGNOSIS — F15.10: ICD-10-CM

## 2019-10-29 DIAGNOSIS — R45.851: ICD-10-CM

## 2019-10-29 DIAGNOSIS — G43.909: ICD-10-CM

## 2019-10-29 DIAGNOSIS — F25.0: Primary | ICD-10-CM

## 2019-10-29 DIAGNOSIS — F17.200: ICD-10-CM

## 2019-10-29 DIAGNOSIS — Z79.899: ICD-10-CM

## 2019-10-29 LAB
BASOPHILS # (AUTO): 0 K/MM3 (ref 0–0.1)
BASOPHILS NFR BLD AUTO: 0.3 % (ref 0–1.8)
BENZODIAZEPINES SCREEN,URINE: (no result)
BILIRUB UR QL STRIP: (no result)
BLOOD UR QL VISUAL: (no result)
BUN SERPL-MCNC: 9 MG/DL (ref 7–17)
BUN/CREAT SERPL: 18 %
CALCIUM SERPL-MCNC: 9 MG/DL (ref 8.4–10.2)
EOSINOPHIL # BLD AUTO: 0.2 K/MM3 (ref 0–0.4)
EOSINOPHIL NFR BLD AUTO: 2.3 % (ref 0–4.3)
HCT VFR BLD CALC: 42.1 % (ref 30.3–42.9)
HEMOLYSIS INDEX: 11
HGB BLD-MCNC: 14.2 GM/DL (ref 10.1–14.3)
LYMPHOCYTES # BLD AUTO: 4.2 K/MM3 (ref 1.2–5.4)
LYMPHOCYTES NFR BLD AUTO: 41.4 % (ref 13.4–35)
MCHC RBC AUTO-ENTMCNC: 34 % (ref 30–34)
MCV RBC AUTO: 94 FL (ref 79–97)
METHADONE SCREEN,URINE: (no result)
MONOCYTES # (AUTO): 0.6 K/MM3 (ref 0–0.8)
MONOCYTES % (AUTO): 6.1 % (ref 0–7.3)
OPIATE SCREEN,URINE: (no result)
PH UR STRIP: 7 [PH] (ref 5–7)
PLATELET # BLD: 246 K/MM3 (ref 140–440)
PROT UR STRIP-MCNC: (no result) MG/DL
RBC # BLD AUTO: 4.45 M/MM3 (ref 3.65–5.03)
RBC #/AREA URNS HPF: 1 /HPF (ref 0–6)
UROBILINOGEN UR-MCNC: < 2 MG/DL (ref ?–2)
WBC #/AREA URNS HPF: 1 /HPF (ref 0–6)

## 2019-10-29 PROCEDURE — 81025 URINE PREGNANCY TEST: CPT

## 2019-10-29 PROCEDURE — 80320 DRUG SCREEN QUANTALCOHOLS: CPT

## 2019-10-29 PROCEDURE — 36415 COLL VENOUS BLD VENIPUNCTURE: CPT

## 2019-10-29 PROCEDURE — 85025 COMPLETE CBC W/AUTO DIFF WBC: CPT

## 2019-10-29 PROCEDURE — 80307 DRUG TEST PRSMV CHEM ANLYZR: CPT

## 2019-10-29 PROCEDURE — G0480 DRUG TEST DEF 1-7 CLASSES: HCPCS

## 2019-10-29 PROCEDURE — 80048 BASIC METABOLIC PNL TOTAL CA: CPT

## 2019-10-29 PROCEDURE — 81001 URINALYSIS AUTO W/SCOPE: CPT

## 2019-10-29 NOTE — EMERGENCY DEPARTMENT REPORT
Blank Doc





- Documentation


Documentation: 





This is a 24-year-old female that presents with SI.  Stated wants to take all 

meth to self harm self.  





This initial assessment/diagnostic orders/clinical plan/treatment(s) is/are 

subject to change based on patient's health status, clinical progression and re-

assessment by fellow clinical providers in the ED.  Further treatment and workup

at subsequent clinical providers discretion.  Patient/guardians urged not to 

elope from the ED as their condition may be serious if not clinically assessed 

and managed.  Initial orders include:


1- Patient sent to MAIN ED for further evaluation and treatment


2- Charge RN was notified to have patient be brought back ASAP.


3- RN was notified to keep patient as close range and observation until room 

available


4- Patient presents with substantial risk of imminent harm to self, appears to 

be so unable to care for his/her own physical health and safety as to create an 

imminently life-endangering crisis, and has committed/expressed life endangering

crisis to self.  Due to this and other complaints, patient is put on 1013.

## 2019-10-29 NOTE — EMERGENCY DEPARTMENT REPORT
ED Psych HPI





- General


Chief Complaint: Psych


Stated Complaint: MARYLU ZENG


Time Seen by Provider: 10/29/19 17:35


Source: patient, family


Mode of arrival: Ambulatory


Limitations: No Limitations





- History of Present Illness


Initial Comments: 





24-year-old female with a past medical history of bipolar disorder and paranoia 

presents to the hospital complaining of suicidal ideation.  Patient states that 

since being in the ER she is no longer feeling suicidal and wants to be placed 

back on her psychiatric medication.   Patient was last prescribed Prozac in [facility] but hasn't taken it for at least one month.  Her plan is to overdose

on meth.  Patient states she has a history of heavy alcohol use and family 

history of alcoholism but last drink was several days ago.  History of suicide 

if into the past by cutting herself.  Patient now stating she no longer feels 

suicidal and is requesting a refill her psychiatric medications.  She denies 

auditory or Visual hallucinations.





- Related Data


                                Home Medications











 Medication  Instructions  Recorded  Confirmed  Last Taken


 


buPROPion [Wellbutrin] 300 mg PO DAILY 10/30/19 10/30/19 Unknown


 


risperiDONE [RisperDAL] 2 mg PO QHS 10/30/19 10/30/19 Unknown











                                    Allergies











Allergy/AdvReac Type Severity Reaction Status Date / Time


 


No Known Allergies Allergy   Verified 10/29/19 17:32














ED Review of Systems


ROS: 


Stated complaint: MH EVAL


Other details as noted in HPI





Comment: All other systems reviewed and negative





ED Past Medical Hx





- Past Medical History


Previous Medical History?: Yes


Hx Psychiatric Treatment: Yes (depression, manic, schizo)


Hx HIV: No


Additional medical history: Pt stated that she suffered from migraine, 

depression





- Surgical History


Past Surgical History?: No





- Social History


Smoking Status: Current Every Day Smoker


Substance Use Type: Alcohol, Methamphetamines





- Medications


Home Medications: 


                                Home Medications











 Medication  Instructions  Recorded  Confirmed  Last Taken  Type


 


buPROPion [Wellbutrin] 300 mg PO DAILY 10/30/19 10/30/19 Unknown History


 


risperiDONE [RisperDAL] 2 mg PO QHS 10/30/19 10/30/19 Unknown History














ED Physical Exam





- General


Limitations: No Limitations





- Other


Other exam information: 





General: No acute distress


Head: Atraumatic


Eyes: normal appearance


ENT: Moist mucous membranes


Neck: Normal appearance, no midline tenderness


Chest: Clear to auscultation bilaterally


CV: Regular rate and rhythm


Abdomen: Soft, normal bowel sounds, nontender, nondistended, no rebound or 

guarding


Back: Normal inspection


Extremity: Normal inspection infection, full range of motion


Neuro: Alert O x 3, no facial asymmetry, speech clear, no gross motor sensory 

deficit


Psych: Appropriate behavior


Skin: No rash





ED Course


                                   Vital Signs











  10/29/19 10/29/19 10/29/19





  17:30 19:31 20:38


 


Temperature 97.8 F 97.8 F 


 


Pulse Rate 91 H 88 


 


Respiratory 20 16 18





Rate   


 


Blood Pressure 126/75  


 


Blood Pressure  116/72 





[Left]   


 


O2 Sat by Pulse 99 98 100





Oximetry   














  10/30/19





  01:24


 


Temperature 98.1 F


 


Pulse Rate 68


 


Respiratory 16





Rate 


 


Blood Pressure 


 


Blood Pressure 100/75





[Left] 


 


O2 Sat by Pulse 97





Oximetry 














- Reevaluation(s)


Reevaluation #1: 





10/30/19 02:39


I will defer to mental health to evaluate and restart patient's psychiatric 

medications since she has been noncompliant for 1 month althoutgh mother states 

she took her Risperdol several days ago











ED Medical Decision Making





- Lab Data


Result diagrams: 


                                 10/29/19 18:01





                                 10/29/19 18:06








                                   Lab Results











  10/29/19 10/29/19 10/29/19 Range/Units





  18:01 18:01 18:01 


 


WBC  10.2    (4.5-11.0)  K/mm3


 


RBC  4.45    (3.65-5.03)  M/mm3


 


Hgb  14.2    (10.1-14.3)  gm/dl


 


Hct  42.1    (30.3-42.9)  %


 


MCV  94    (79-97)  fl


 


MCH  32    (28-32)  pg


 


MCHC  34    (30-34)  %


 


RDW  13.8    (13.2-15.2)  %


 


Plt Count  246    (140-440)  K/mm3


 


Lymph % (Auto)  41.4 H    (13.4-35.0)  %


 


Mono % (Auto)  6.1    (0.0-7.3)  %


 


Eos % (Auto)  2.3    (0.0-4.3)  %


 


Baso % (Auto)  0.3    (0.0-1.8)  %


 


Lymph #  4.2    (1.2-5.4)  K/mm3


 


Mono #  0.6    (0.0-0.8)  K/mm3


 


Eos #  0.2    (0.0-0.4)  K/mm3


 


Baso #  0.0    (0.0-0.1)  K/mm3


 


Seg Neutrophils %  49.9    (40.0-70.0)  %


 


Seg Neutrophils #  5.1    (1.8-7.7)  K/mm3


 


Sodium     (137-145)  mmol/L


 


Potassium     (3.6-5.0)  mmol/L


 


Chloride     ()  mmol/L


 


Carbon Dioxide     (22-30)  mmol/L


 


Anion Gap     mmol/L


 


BUN     (7-17)  mg/dL


 


Creatinine     (0.7-1.2)  mg/dL


 


Estimated GFR     ml/min


 


BUN/Creatinine Ratio     %


 


Glucose     ()  mg/dL


 


Calcium     (8.4-10.2)  mg/dL


 


Urine Color     (Yellow)  


 


Urine Turbidity     (Clear)  


 


Urine pH     (5.0-7.0)  


 


Ur Specific Gravity     (1.003-1.030)  


 


Urine Protein     (Negative)  mg/dL


 


Urine Glucose (UA)     (Negative)  mg/dL


 


Urine Ketones     (Negative)  mg/dL


 


Urine Blood     (Negative)  


 


Urine Nitrite     (Negative)  


 


Urine Bilirubin     (Negative)  


 


Urine Urobilinogen     (<2.0)  mg/dL


 


Ur Leukocyte Esterase     (Negative)  


 


Urine WBC (Auto)     (0.0-6.0)  /HPF


 


Urine RBC (Auto)     (0.0-6.0)  /HPF


 


U Epithel Cells (Auto)     (0-13.0)  /HPF


 


Urine HCG, Qual     (Negative)  


 


Salicylates   < 0.3 L   (2.8-20.0)  mg/dL


 


Urine Opiates Screen     


 


Urine Methadone Screen     


 


Acetaminophen     (10.0-30.0)  ug/mL


 


Ur Barbiturates Screen     


 


Ur Phencyclidine Scrn     


 


Ur Amphetamines Screen     


 


U Benzodiazepines Scrn     


 


Urine Cocaine Screen     


 


U Marijuana (THC) Screen     


 


Drugs of Abuse Note     


 


Plasma/Serum Alcohol    < 0.01  (0-0.07)  %














  10/29/19 10/29/19 10/29/19 Range/Units





  18:06 18:06 Unknown 


 


WBC     (4.5-11.0)  K/mm3


 


RBC     (3.65-5.03)  M/mm3


 


Hgb     (10.1-14.3)  gm/dl


 


Hct     (30.3-42.9)  %


 


MCV     (79-97)  fl


 


MCH     (28-32)  pg


 


MCHC     (30-34)  %


 


RDW     (13.2-15.2)  %


 


Plt Count     (140-440)  K/mm3


 


Lymph % (Auto)     (13.4-35.0)  %


 


Mono % (Auto)     (0.0-7.3)  %


 


Eos % (Auto)     (0.0-4.3)  %


 


Baso % (Auto)     (0.0-1.8)  %


 


Lymph #     (1.2-5.4)  K/mm3


 


Mono #     (0.0-0.8)  K/mm3


 


Eos #     (0.0-0.4)  K/mm3


 


Baso #     (0.0-0.1)  K/mm3


 


Seg Neutrophils %     (40.0-70.0)  %


 


Seg Neutrophils #     (1.8-7.7)  K/mm3


 


Sodium  141    (137-145)  mmol/L


 


Potassium  4.0    (3.6-5.0)  mmol/L


 


Chloride  107.6 H    ()  mmol/L


 


Carbon Dioxide  21 L    (22-30)  mmol/L


 


Anion Gap  16    mmol/L


 


BUN  9    (7-17)  mg/dL


 


Creatinine  0.5 L    (0.7-1.2)  mg/dL


 


Estimated GFR  > 60    ml/min


 


BUN/Creatinine Ratio  18    %


 


Glucose  85    ()  mg/dL


 


Calcium  9.0    (8.4-10.2)  mg/dL


 


Urine Color    Yellow  (Yellow)  


 


Urine Turbidity    Clear  (Clear)  


 


Urine pH    7.0  (5.0-7.0)  


 


Ur Specific Gravity    1.015  (1.003-1.030)  


 


Urine Protein    <15 mg/dl  (Negative)  mg/dL


 


Urine Glucose (UA)    Neg  (Negative)  mg/dL


 


Urine Ketones    Neg  (Negative)  mg/dL


 


Urine Blood    Neg  (Negative)  


 


Urine Nitrite    Neg  (Negative)  


 


Urine Bilirubin    Neg  (Negative)  


 


Urine Urobilinogen    < 2.0  (<2.0)  mg/dL


 


Ur Leukocyte Esterase    Neg  (Negative)  


 


Urine WBC (Auto)    1.0  (0.0-6.0)  /HPF


 


Urine RBC (Auto)    1.0  (0.0-6.0)  /HPF


 


U Epithel Cells (Auto)    7.0  (0-13.0)  /HPF


 


Urine HCG, Qual    Negative  (Negative)  


 


Salicylates     (2.8-20.0)  mg/dL


 


Urine Opiates Screen     


 


Urine Methadone Screen     


 


Acetaminophen   < 5.0 L   (10.0-30.0)  ug/mL


 


Ur Barbiturates Screen     


 


Ur Phencyclidine Scrn     


 


Ur Amphetamines Screen     


 


U Benzodiazepines Scrn     


 


Urine Cocaine Screen     


 


U Marijuana (THC) Screen     


 


Drugs of Abuse Note     


 


Plasma/Serum Alcohol     (0-0.07)  %














  10/29/19 Range/Units





  Unknown 


 


WBC   (4.5-11.0)  K/mm3


 


RBC   (3.65-5.03)  M/mm3


 


Hgb   (10.1-14.3)  gm/dl


 


Hct   (30.3-42.9)  %


 


MCV   (79-97)  fl


 


MCH   (28-32)  pg


 


MCHC   (30-34)  %


 


RDW   (13.2-15.2)  %


 


Plt Count   (140-440)  K/mm3


 


Lymph % (Auto)   (13.4-35.0)  %


 


Mono % (Auto)   (0.0-7.3)  %


 


Eos % (Auto)   (0.0-4.3)  %


 


Baso % (Auto)   (0.0-1.8)  %


 


Lymph #   (1.2-5.4)  K/mm3


 


Mono #   (0.0-0.8)  K/mm3


 


Eos #   (0.0-0.4)  K/mm3


 


Baso #   (0.0-0.1)  K/mm3


 


Seg Neutrophils %   (40.0-70.0)  %


 


Seg Neutrophils #   (1.8-7.7)  K/mm3


 


Sodium   (137-145)  mmol/L


 


Potassium   (3.6-5.0)  mmol/L


 


Chloride   ()  mmol/L


 


Carbon Dioxide   (22-30)  mmol/L


 


Anion Gap   mmol/L


 


BUN   (7-17)  mg/dL


 


Creatinine   (0.7-1.2)  mg/dL


 


Estimated GFR   ml/min


 


BUN/Creatinine Ratio   %


 


Glucose   ()  mg/dL


 


Calcium   (8.4-10.2)  mg/dL


 


Urine Color   (Yellow)  


 


Urine Turbidity   (Clear)  


 


Urine pH   (5.0-7.0)  


 


Ur Specific Gravity   (1.003-1.030)  


 


Urine Protein   (Negative)  mg/dL


 


Urine Glucose (UA)   (Negative)  mg/dL


 


Urine Ketones   (Negative)  mg/dL


 


Urine Blood   (Negative)  


 


Urine Nitrite   (Negative)  


 


Urine Bilirubin   (Negative)  


 


Urine Urobilinogen   (<2.0)  mg/dL


 


Ur Leukocyte Esterase   (Negative)  


 


Urine WBC (Auto)   (0.0-6.0)  /HPF


 


Urine RBC (Auto)   (0.0-6.0)  /HPF


 


U Epithel Cells (Auto)   (0-13.0)  /HPF


 


Urine HCG, Qual   (Negative)  


 


Salicylates   (2.8-20.0)  mg/dL


 


Urine Opiates Screen  Presumptive negative  


 


Urine Methadone Screen  Presumptive negative  


 


Acetaminophen   (10.0-30.0)  ug/mL


 


Ur Barbiturates Screen  Presumptive negative  


 


Ur Phencyclidine Scrn  Presumptive negative  


 


Ur Amphetamines Screen  Presumptive negative  


 


U Benzodiazepines Scrn  Presumptive negative  


 


Urine Cocaine Screen  Presumptive negative  


 


U Marijuana (THC) Screen  Presumptive negative  


 


Drugs of Abuse Note  Disclamer  


 


Plasma/Serum Alcohol   (0-0.07)  %














- Medical Decision Making





Patient presents to the hospital with suicidal ideation requesting a refill her 

psychiatric medication.  Patient been noncompliant for greater than 1 month.  

1013 has been signed and mental health evaluation pending.  Patient is medically

 cleared for psychiatric admission.





- Differential Diagnosis


si,hi, depressio, drug abuse


Critical Care Time: No


Critical care attestation.: 


If time is entered above; I have spent that time in minutes in the direct care o

f this critically ill patient, excluding procedure time.








ED Disposition


Clinical Impression: 


 Bipolar disorder, Suicidal ideation, Noncompliance with medication regimen, 

Polysubstance abuse, Medical clearance for psychiatric admission





Disposition: DC/TX-65 PSY HOSP/PSY UNIT


Is pt being admited?: No


Condition: Stable


Referrals: 


PRIMARY CARE,MD [Primary Care Provider] - 3-5 Days


Time of Disposition: 02:09 (awaiting acceptance)

## 2019-10-30 VITALS — DIASTOLIC BLOOD PRESSURE: 67 MMHG | SYSTOLIC BLOOD PRESSURE: 104 MMHG

## 2019-10-30 NOTE — CONSULTATION
History of Present Illness





- Reason for Consult


Consult date: 10/30/19


Reason for consult: Initial Psychiatric Evaluation





- History of Present Psychiatric Illness


Patient is a 24-year-old female that presents to the emergency room  with 

suicidal ideations. Patient verbalizes that she wanted  to take all meth to self

harm self. Today the patient 








Current Psychiatric Medications:


Past Psychiatric History:


Past Medication Trials:


History of Alcohol/Drug Abuse:


History of Trauma/Abuse:


Social History:


Family History of Psychiatric Illness/Substance Abuse:





Mental Status Exam:





 Appearance: calm  


 Behavior: regular eye contact 


 Speech: regular rate and tone 


 Mood: ""


 Affect: congruent to mood


 Thought Process:     


 Thought Content: denies SI/HI's and AVH's, delusional   


 Motor Activity: lying in bed


 Cognition: A/O x 3


 Insight: variable


 Judgment: variable 




















Impression:  Today the patient is calm and cooperative during the assessment. 











Recommendation/Plan: 


1. Continue 1013.


2.   PO HS for depression/sleep, and increase  PO HS for psychosis/mood. 

Continue Q6hrs PRN for acute psychosis. Recommend Delirium precautions belowt 

was referred to inpatient psy services.    











Medications and Allergies


                                    Allergies











Allergy/AdvReac Type Severity Reaction Status Date / Time


 


No Known Allergies Allergy   Verified 10/29/19 17:32











                                Home Medications











 Medication  Instructions  Recorded  Confirmed  Last Taken  Type


 


buPROPion [Wellbutrin] 300 mg PO DAILY 10/30/19 10/30/19 Unknown History


 


risperiDONE [RisperDAL] 2 mg PO QHS 10/30/19 10/30/19 Unknown History














Mental Status Exam





- Vital signs


                                Last Vital Signs











Temp  98.1 F   10/30/19 01:24


 


Pulse  68   10/30/19 01:24


 


Resp  16   10/30/19 01:24


 


BP  100/75   10/30/19 01:24


 


Pulse Ox  97   10/30/19 01:24














Results


Result Diagrams: 


                                 10/29/19 18:01





                                 10/29/19 18:06


                              Abnormal lab results











  10/29/19 10/29/19 10/29/19 Range/Units





  18:01 18:01 18:06 


 


Lymph % (Auto)  41.4 H    (13.4-35.0)  %


 


Chloride    107.6 H  ()  mmol/L


 


Carbon Dioxide    21 L  (22-30)  mmol/L


 


Creatinine    0.5 L  (0.7-1.2)  mg/dL


 


Salicylates   < 0.3 L   (2.8-20.0)  mg/dL


 


Acetaminophen     (10.0-30.0)  ug/mL














  10/29/19 Range/Units





  18:06 


 


Lymph % (Auto)   (13.4-35.0)  %


 


Chloride   ()  mmol/L


 


Carbon Dioxide   (22-30)  mmol/L


 


Creatinine   (0.7-1.2)  mg/dL


 


Salicylates   (2.8-20.0)  mg/dL


 


Acetaminophen  < 5.0 L  (10.0-30.0)  ug/mL








All other labs normal.